# Patient Record
Sex: FEMALE | Race: WHITE | Employment: FULL TIME | URBAN - NONMETROPOLITAN AREA
[De-identification: names, ages, dates, MRNs, and addresses within clinical notes are randomized per-mention and may not be internally consistent; named-entity substitution may affect disease eponyms.]

---

## 2020-06-04 ENCOUNTER — HOSPITAL ENCOUNTER (OUTPATIENT)
Age: 30
Setting detail: SPECIMEN
Discharge: HOME | End: 2020-06-04
Payer: COMMERCIAL

## 2020-06-04 LAB — RUBV IGG SER QL: 115.5 IU/ML

## 2020-06-04 PROCEDURE — 86762 RUBELLA ANTIBODY: CPT

## 2020-09-17 ENCOUNTER — ROUTINE PRENATAL (OUTPATIENT)
Dept: OBGYN CLINIC | Age: 30
End: 2020-09-17
Payer: COMMERCIAL

## 2020-09-17 VITALS
DIASTOLIC BLOOD PRESSURE: 72 MMHG | HEIGHT: 64 IN | SYSTOLIC BLOOD PRESSURE: 102 MMHG | BODY MASS INDEX: 24.24 KG/M2 | WEIGHT: 142 LBS

## 2020-09-17 PROCEDURE — 99213 OFFICE O/P EST LOW 20 MIN: CPT | Performed by: OBSTETRICS & GYNECOLOGY

## 2020-09-17 NOTE — PROGRESS NOTES
DATE OF VISIT:  9/17/20    PATIENT NAME:  Edy Bass     YOB: 1990    REASON FOR VISIT:  No chief complaint on file. HISTORY OF PRESENT ILLNESS:  Had some bleeding at the beg of the preg - nothing since - no cramping; some food aversions; had hx of endometriosis and didn't think this would happen so fast      Patient's last menstrual period was 07/24/2020 (exact date). Vitals:    09/17/20 1616   BP: 102/72   Site: Right Upper Arm   Position: Sitting   Weight: 142 lb (64.4 kg)   Height: 5' 4\" (1.626 m)     Body mass index is 24.37 kg/m². No Known Allergies  Current Outpatient Medications   Medication Sig Dispense Refill    Prenatal Vit-Fe Fumarate-FA (PRENATAL VITAMINS PO) Take by mouth      FISH OIL-KRILL OIL PO Take by mouth      Probiotic Product (PROBIOTIC DAILY PO) Take by mouth       No current facility-administered medications for this visit.       Social History     Socioeconomic History    Marital status:      Spouse name: None    Number of children: None    Years of education: None    Highest education level: None   Occupational History    None   Social Needs    Financial resource strain: None    Food insecurity     Worry: None     Inability: None    Transportation needs     Medical: None     Non-medical: None   Tobacco Use    Smoking status: Never Smoker    Smokeless tobacco: Never Used   Substance and Sexual Activity    Alcohol use: Not Currently    Drug use: Never    Sexual activity: Yes     Partners: Male   Lifestyle    Physical activity     Days per week: None     Minutes per session: None    Stress: None   Relationships    Social connections     Talks on phone: None     Gets together: None     Attends [employer]ism service: None     Active member of club or organization: None     Attends meetings of clubs or organizations: None     Relationship status: None    Intimate partner violence     Fear of current or ex partner: None     Emotionally abused: None     Physically abused: None     Forced sexual activity: None   Other Topics Concern    None   Social History Narrative    None       REVIEW OF SYSTEMS:  Review of Systems   Gastrointestinal: Positive for nausea (food aversion). Genitourinary: Positive for menstrual problem (missed menses). Negative for dysuria, vaginal bleeding and vaginal discharge. PHYSICAL EXAM:  /72 (Site: Right Upper Arm, Position: Sitting)   Ht 5' 4\" (1.626 m)   Wt 142 lb (64.4 kg)   LMP 07/24/2020 (Exact Date)   BMI 24.37 kg/m²   Physical Exam  Constitutional:       Appearance: Normal appearance. HENT:      Head: Normocephalic and atraumatic. Eyes:      Extraocular Movements: Extraocular movements intact. Pupils: Pupils are equal, round, and reactive to light. Neck:      Musculoskeletal: Normal range of motion. Cardiovascular:      Rate and Rhythm: Normal rate. Pulmonary:      Effort: Pulmonary effort is normal.   Musculoskeletal: Normal range of motion. Neurological:      Mental Status: She is alert and oriented to person, place, and time. Skin:     General: Skin is warm and dry. Psychiatric:         Mood and Affect: Mood normal.         Behavior: Behavior normal.         Thought Content: Thought content normal.         Judgment: Judgment normal.         ASSESSMENT:    1. Amenorrhea        PLAN:  No orders of the defined types were placed in this encounter. Return in about 4 weeks (around 10/15/2020) for ob new ob/pni.        Electronically signed by Varsha Burnette DO on 09/19/20

## 2020-09-19 ASSESSMENT — ENCOUNTER SYMPTOMS: NAUSEA: 1

## 2020-10-13 ENCOUNTER — TELEPHONE (OUTPATIENT)
Dept: OBGYN CLINIC | Age: 30
End: 2020-10-13

## 2020-10-15 ENCOUNTER — INITIAL PRENATAL (OUTPATIENT)
Dept: OBGYN CLINIC | Age: 30
End: 2020-10-15

## 2020-10-15 ENCOUNTER — HOSPITAL ENCOUNTER (OUTPATIENT)
Age: 30
Setting detail: SPECIMEN
Discharge: HOME | End: 2020-10-15
Payer: COMMERCIAL

## 2020-10-15 VITALS
DIASTOLIC BLOOD PRESSURE: 62 MMHG | HEIGHT: 64 IN | BODY MASS INDEX: 25.1 KG/M2 | SYSTOLIC BLOOD PRESSURE: 108 MMHG | WEIGHT: 147 LBS

## 2020-10-15 VITALS — SYSTOLIC BLOOD PRESSURE: 108 MMHG | DIASTOLIC BLOOD PRESSURE: 62 MMHG | WEIGHT: 147 LBS | BODY MASS INDEX: 25.23 KG/M2

## 2020-10-15 LAB
ABO/RH: NORMAL
ABSOLUTE EOS #: 0.04 K/UL (ref 0–0.44)
ABSOLUTE IMMATURE GRANULOCYTE: 0.04 K/UL (ref 0–0.3)
ABSOLUTE LYMPH #: 1.77 K/UL (ref 1.1–3.7)
ABSOLUTE MONO #: 0.56 K/UL (ref 0.1–1.2)
ANTIBODY SCREEN: NEGATIVE
BASOPHILS # BLD: 1 % (ref 0–2)
BASOPHILS ABSOLUTE: 0.06 K/UL (ref 0–0.2)
BILIRUBIN URINE: NEGATIVE
COLOR: YELLOW
COMMENT UA: ABNORMAL
DIFFERENTIAL TYPE: ABNORMAL
EOSINOPHILS RELATIVE PERCENT: 1 % (ref 1–4)
GLUCOSE URINE: ABNORMAL
HCT VFR BLD CALC: 36.3 % (ref 36.3–47.1)
HEMOGLOBIN: 12 G/DL (ref 11.9–15.1)
HEPATITIS B SURFACE ANTIGEN: NONREACTIVE
HEPATITIS C ANTIBODY: NONREACTIVE
HIV AG/AB: NONREACTIVE
IMMATURE GRANULOCYTES: 1 %
KETONES, URINE: NEGATIVE
LEUKOCYTE ESTERASE, URINE: NEGATIVE
LYMPHOCYTES # BLD: 20 % (ref 24–43)
MCH RBC QN AUTO: 30.9 PG (ref 25.2–33.5)
MCHC RBC AUTO-ENTMCNC: 33.1 G/DL (ref 28.4–34.8)
MCV RBC AUTO: 93.6 FL (ref 82.6–102.9)
MONOCYTES # BLD: 6 % (ref 3–12)
NITRITE, URINE: NEGATIVE
NRBC AUTOMATED: 0 PER 100 WBC
PDW BLD-RTO: 12.1 % (ref 11.8–14.4)
PH UA: 6.5 (ref 5–8)
PLATELET # BLD: 278 K/UL (ref 138–453)
PLATELET ESTIMATE: ABNORMAL
PMV BLD AUTO: 9.6 FL (ref 8.1–13.5)
PROTEIN UA: NEGATIVE
RBC # BLD: 3.88 M/UL (ref 3.95–5.11)
RBC # BLD: ABNORMAL 10*6/UL
RUBV IGG SER QL: 86.3 IU/ML
SEG NEUTROPHILS: 71 % (ref 36–65)
SEGMENTED NEUTROPHILS ABSOLUTE COUNT: 6.37 K/UL (ref 1.5–8.1)
SPECIFIC GRAVITY UA: 1 (ref 1–1.03)
T. PALLIDUM, IGG: NONREACTIVE
TURBIDITY: CLEAR
URINE HGB: NEGATIVE
UROBILINOGEN, URINE: NORMAL
WBC # BLD: 8.8 K/UL (ref 3.5–11.3)
WBC # BLD: ABNORMAL 10*3/UL

## 2020-10-15 PROCEDURE — 0500F INITIAL PRENATAL CARE VISIT: CPT | Performed by: NURSE PRACTITIONER

## 2020-10-15 NOTE — PROGRESS NOTES
Here for PNI, has had some nausea but states it is improving. Denies hx of MRSA. Unsure about genetic testing but does desire MSAFP at appropriate time. Has NOB appt following PNI.  will be drawn today. Questions answered and forms signed.

## 2020-10-15 NOTE — PATIENT INSTRUCTIONS
Patient Education        Weeks 10 to 14 of Your Pregnancy: Care Instructions  Your Care Instructions     By weeks 10 to 14 of your pregnancy, the placenta has formed inside your uterus. It is possible to hear your baby's heartbeat with a special ultrasound device. Your baby's eyes can and do move. The arms and legs can bend. This is a good time to think about testing for birth defects. There are two types of tests: screening and diagnostic. Screening tests show the chance that a baby has a certain birth defect. They can't tell you for sure that your baby has a problem. Diagnostic tests show if a baby has a certain birth defect. It's your choice whether to have these tests. You and your partner can talk to your doctor or midwife about birth defects tests. Follow-up care is a key part of your treatment and safety. Be sure to make and go to all appointments, and call your doctor if you are having problems. It's also a good idea to know your test results and keep a list of the medicines you take. How can you care for yourself at home? Decide about tests  · You can have screening tests and diagnostic tests to check for birth defects. The decision to have a test for birth defects is personal. Think about your age, your chance of passing on a family disease, your need to know about any problems, and what you might do after you have the test results. ? Triple or quadruple (quad) blood tests. These screening tests can be done between 15 and 20 weeks of pregnancy. They check the amounts of three or four substances in your blood. The doctor looks at these test results, along with your age and other factors, to find out the chance that your baby may have certain problems. ? Amniocentesis. This diagnostic test is used to look for chromosomal problems in the baby's cells.  It can be done between 15 and 20 weeks of pregnancy, usually around week 16.  ? Nuchal translucency test. This test uses ultrasound to measure the thickness of the area at the back of the baby's neck. An increase in the thickness can be an early sign of Down syndrome. ? Chorionic villus sampling (CVS). This is a test that looks for certain genetic problems with your baby. The same genes that are in your baby are in the placenta. A small piece of the placenta is taken out and tested. This test is done when you are 10 to 13 weeks pregnant. Ease discomfort  · Slow down and take naps when you feel tired. · If your emotions swing, talk to someone. Crying, anxiety, and concentration problems are common. · If your gums bleed, try a softer toothbrush. If your gums are puffy and bleed a lot, see your dentist.  · If you feel dizzy:  ? Get up slowly after sitting or lying down. ? Drink plenty of fluids. ? Eat small snacks to keep your blood sugar stable. ? Put your head between your legs as though you were tying your shoelaces. ? Lie down with your legs higher than your head. Use pillows to prop up your feet. · If you have a headache:  ? Lie down. ? Ask your partner or a good friend for a neck massage. ? Try cool cloths over your forehead or across the back of your neck. ? Use acetaminophen (Tylenol) for pain relief. Do not use nonsteroidal anti-inflammatory drugs (NSAIDs), such as ibuprofen (Advil, Motrin) or naproxen (Aleve), unless your doctor says it is okay. · If you have a nosebleed, pinch your nose gently, and hold it for a short while. To prevent nosebleeds, try massaging a small dab of petroleum jelly, such as Vaseline, in your nostrils. · If your nose is stuffed up, try saline (saltwater) nose sprays. Do not use decongestant sprays. Care for your breasts  · Wear a bra that gives you good support. · Know that changes in your breasts are normal.  ? Your breasts may get larger and more tender. Tenderness usually gets better by 12 weeks. ? Your nipples may get darker and larger, and small bumps around your nipples may show more. ?  The veins in your chest and breasts may show more. · Don't worry about \"toughening'\" your nipples. Breastfeeding will naturally do this. Where can you learn more? Go to https://[place] GenomicspematthewMaytech.[employer] Software. org and sign in to your [employer] account. Enter S059 in the [employer] box to learn more about \"Weeks 10 to 14 of Your Pregnancy: Care Instructions. \"     If you do not have an account, please click on the \"Sign Up Now\" link. Current as of: February 11, 2020               Content Version: 12.6  © 3383-1669 [employer]ett[employer]. Care instructions adapted under license by [email or web address] [facility] ([facility]). If you have questions about a medical condition or this instruction, always ask your healthcare professional. [address] any warranty or liability for your use of this information. Patient Education        Learning About Starting to Breastfeed  Planning ahead     Before your baby is born, plan ahead. Learn all you can about breastfeeding. This helps make breastfeeding easier. · Early in your pregnancy, talk to your doctor or midwife about breastfeeding. · Learn the basics before your baby is born. The staff at hospitals and birthing centers can help you find a lactation specialist. This person is often a nurse who has been trained to teach and advise women about breastfeeding. Or you can take a breastfeeding class. · Plan ahead for times when you will need help after your baby is born. Many women get help from friends and family. Some join a support group to talk to other moms who breastfeed. · Buy the equipment you'll need. Examples are breast pads, nipple cream, extra pillows, and nursing bras. Find out about breast pumps too. Getting help from your hospital or birthing center  It's important to have support from the doctors, nurses, and hospital staff who care for you and your baby.  Before it's time for you to give birth, ask about the breastfeeding policies at your hospital or birthing center. Look for a hospital or birthing center that has policies for:  · \"Rooming in. \" This policy encourages you to have your baby in the room with you. It can allow you to breastfeed more often. · Supplemental feedings. Tell the staff that your baby is to get only your breast milk from birth. If staff feed your baby water, sugar solution, or formula right after birth without a medical reason, it may make it harder for you to breastfeed. · Pacifiers or artificial nipples. Staff should not give your  these items. They may interfere with breastfeeding. · Follow-up. Find out if your hospital can help you with breastfeeding issues after you go home. See if you can get information on support groups or other contacts. They might help if you need help setting up and staying with your breastfeeding routine. Your first feeding  It's best to start breastfeeding within 1 hour of birth. For each feeding, you go through these basic steps:  · Get ready for the feeding. Be calm and relaxed, and try not to be distracted. Get some water or juice for yourself. Use two or three pillows to help support your baby while he or she is nursing. · Find a breastfeeding position that is comfortable for you and your baby. Examples are the cradle and the football positions. Make sure the baby's head and chest are lined up straight and facing your breast. It's best to switch which breast you start with each time. · Get the baby latched on well. Your baby's mouth needs to be wide open, like a yawn, so you may need to gently touch the middle of your baby's lower lip. When your baby's mouth is open wide, quickly bring the baby onto your nipple and areola. The areola is the dark [place] around your nipple. · Provide a complete feeding. Let your baby decide how long to nurse. Be sure to burp your baby after each breast.  In the first days after birth, your breasts make a thick, yellow liquid called colostrum.  This liquid gives your baby nutrients and antibodies against infection. It is all that babies need at first. Your breasts will fill with milk a few days after the birth. Talk to your doctor, midwife, or lactation specialist right away if you are having problems and aren't sure what to do. How often to breastfeed  Plan to breastfeed your baby on demand rather than setting a strict schedule. For the first 2 weeks, be prepared to breastfeed at least 8 times in a 24-hour period. In the first few days, you may need to wake a sleeping baby to feed. If you breastfeed more often, it will help your breasts to produce more milk. After you go home  After you're home, don't be afraid to call your doctor, midwife, or lactation specialist with questions. That's true even if you don't know what's bothering you. They are used to parents of newborns calling. They can help you figure out if there is a problem, and if so, how to fix it. Plan for times when you will be apart from your baby. Use a breast pump to collect breast milk ahead of time. You can store milk in the refrigerator or freezer. Then it's ready when someone else will be taking care of your baby. Experts recommend waiting about a month until breastfeeding is going well before offering a bottle. Breastfeeding is a learned skill that gets easier over time. You are more likely to succeed if you plan ahead, learn the basic techniques, and know where to get help and support. Where can you learn more? Go to https://ScreachTVvickieewNationwide Vacation Club.Prognosis Health Information Systems. org and sign in to your [employer] account. Enter X216 in the ConnectAndSell box to learn more about \"Learning About Starting to Breastfeed. \"     If you do not have an account, please click on the \"Sign Up Now\" link. Current as of: February 11, 2020               Content Version: 12.6  © 0288-5239 [employer], Incorporated. Care instructions adapted under license by [facility] ([facility]).  If you have questions about a medical condition or this instruction, always ask your healthcare professional. [place] [address] any warranty or liability for your use of this information. Patient Education        Nutrition During Pregnancy: Care Instructions  Your Care Instructions     Healthy eating when you are pregnant is important for you and your baby. It can help you feel well and have a successful pregnancy and delivery. During pregnancy your nutrition needs increase. Even if you have excellent eating habits, your doctor may recommend a multivitamin to make sure you get enough iron and folic acid. Many pregnant women wonder how much weight they should gain. In general, women who were at a healthy weight before they became pregnant should gain between 25 and 35 pounds. Women who were overweight before pregnancy are usually advised to gain 15 to 25 pounds. Women who were underweight before pregnancy are usually advised to gain 28 to 40 pounds. Your doctor will work with you to set a weight goal that is right for you. Gaining a healthy amount of weight helps you have a healthy baby. Follow-up care is a key part of your treatment and safety. Be sure to make and go to all appointments, and call your doctor if you are having problems. It's also a good idea to know your test results and keep a list of the medicines you take. How can you care for yourself at home? · Eat plenty of fruits and vegetables. Include a variety of orange, yellow, and leafy dark-green vegetables every day. · Choose whole-grain bread, cereal, and pasta. Good choices include whole wheat bread, whole wheat pasta, brown rice, and oatmeal.  · Get 4 or more servings of milk and milk products each day. Good choices include nonfat or low-fat milk, yogurt, and cheese. If you cannot eat milk products, you can get calcium from calcium-fortified products such as orange juice, soy milk, and tofu.  Other non-milk sources of calcium include leafy green vegetables, such as broccoli, kale, mustard greens, turnip greens, bok payal, and brussels sprouts. · If you eat meat, pick lower-fat types. Good choices include lean cuts of meat and chicken or turkey without the skin. · Do not eat shark, swordfish, antoine mackerel, or tilefish. They have high levels of mercury, which is dangerous to your baby. You can eat up to 12 ounces a week of fish or shellfish that have low mercury levels. Good choices include shrimp, wild salmon, pollock, and catfish. Do not eat more than 6 ounces of tuna each week. · Heat lunch meats (such as turkey, ham, or bologna) to 165°F before you eat them. This reduces your risk of getting sick from a kind of bacteria that can be found in lunch meats. · Do not eat unpasteurized soft cheeses, such as brie, feta, fresh mozzarella, and blue cheese. They have a bacteria that could harm your baby. · Limit caffeine. If you drink coffee or tea, have no more than 1 cup a day. Caffeine is also found in greg. · Do not drink any alcohol. No amount of alcohol has been found to be safe during pregnancy. · Do not diet or try to lose weight. For example, do not follow a low-carbohydrate diet. If you are overweight at the start of your pregnancy, your doctor will work with you to manage your weight gain. · Tell your doctor about all vitamins and supplements you take. When should you call for help? Watch closely for changes in your health, and be sure to contact your doctor if you have any problems. Where can you learn more? Go to https://NodePrimedee dee.healthZarthCode. org and sign in to your [employer] account. Enter Y785 in the [employer] box to learn more about \"Nutrition During Pregnancy: Care Instructions. \"     If you do not have an account, please click on the \"Sign Up Now\" link. Current as of: February 11, 2020               Content Version: 12.6  © 8789-8621 Follicum, Incorporated. Care instructions adapted under license by [facility] ([facility]).  If you have questions about a medical

## 2020-10-15 NOTE — PROGRESS NOTES
routine OB. There are no Patient Instructions on file for this visit.           Cheng Cooley,10/15/2020 2:00 PM

## 2020-10-22 LAB
HPV SOURCE: NORMAL
HPV, GENOTYPE 16: NOT DETECTED
HPV, GENOTYPE 18: NOT DETECTED
HPV, HIGH RISK OTHER: NOT DETECTED

## 2020-10-23 LAB
CHLAMYDIA BY THIN PREP: NEGATIVE
CYTOLOGY REPORT: NORMAL
N. GONORRHOEAE DNA, THIN PREP: NEGATIVE
SPECIMEN DESCRIPTION: NORMAL

## 2020-11-10 ENCOUNTER — TELEPHONE (OUTPATIENT)
Dept: OBGYN CLINIC | Age: 30
End: 2020-11-10

## 2020-11-10 NOTE — TELEPHONE ENCOUNTER
Pt called stating she has an appt with Dr Mami Pedraza on Thurs and would like to have genetic testing done at that time. Prequel and Foresight forms filled out and in lab.

## 2020-11-12 ENCOUNTER — ROUTINE PRENATAL (OUTPATIENT)
Dept: OBGYN CLINIC | Age: 30
End: 2020-11-12

## 2020-11-12 ENCOUNTER — HOSPITAL ENCOUNTER (OUTPATIENT)
Age: 30
Setting detail: SPECIMEN
Discharge: HOME | End: 2020-11-12
Payer: COMMERCIAL

## 2020-11-12 VITALS — DIASTOLIC BLOOD PRESSURE: 68 MMHG | BODY MASS INDEX: 25.75 KG/M2 | SYSTOLIC BLOOD PRESSURE: 112 MMHG | WEIGHT: 150 LBS

## 2020-11-12 PROCEDURE — 0502F SUBSEQUENT PRENATAL CARE: CPT | Performed by: OBSTETRICS & GYNECOLOGY

## 2020-11-12 NOTE — PROGRESS NOTES
Sharri Vazquez is a 27 y.o. female 14w8d      The patient was seen and evaluated. Fetal movement was Absent . No contractions or leakage of fluid. Signs and symptoms of  labor as well as labor were reviewed. Genetic testing was ordered and reviewed. MSAFP was ordered for a 15-20 week gestational age window.  Reviewed. Dates were reviewed with the patient. An 18-22 week anatomy ultrasound has been ordered. The patient will return to the office for her next visit in 4 weeks. See antepartum flow sheet. Denies hx of MRSA  Desires Prequel, Foresight and MSAFP        Assessment:  1. Sharri Vazquez is a 27 y.o. female  2.   3. 15w6d    There are no active problems to display for this patient. Diagnosis Orders   1. 15 weeks gestation of pregnancy  Alpha Fetoprotein, Maternal    Prenatal Testing for Fetal Aneuploidy    Cystic fibrosis carrier study         Plan:  Return in about 4 weeks (around 12/10/2020) for ob, usn. There are no Patient Instructions on file for this visit.

## 2020-11-15 LAB
AFP INTERPRETATION: NORMAL
AFP MOM: 0.78
AFP SPECIMEN: NORMAL
AFP: 25 NG/ML
DATE OF BIRTH: NORMAL
DATING METHOD: NORMAL
DETERMINED BY: NORMAL
DIABETIC: NORMAL
DUE DATE: NORMAL
ESTIMATED DUE DATE: NORMAL
FAMILY HISTORY NTD: NORMAL
GESTATIONAL AGE: NORMAL
INSULIN REQ DIABETES: NO
LAST MENSTRUAL PERIOD: NORMAL
MATERNAL AGE AT EDD: 31.3 YR
MATERNAL WEIGHT: 150
MONOCHORIONIC TWINS: NORMAL
NUMBER OF FETUSES: NORMAL
PATIENT WEIGHT UNITS: NORMAL
PATIENT WEIGHT: NORMAL
RACE (MATERNAL): NORMAL
RACE: NORMAL
REPEAT SPECIMEN?: NO
SMOKING: NO
SMOKING: NORMAL
VALPROIC/CARBAMAZEP: NORMAL
ZZ NTE CLEAN UP: HISTORY: NO

## 2020-12-08 ENCOUNTER — TELEPHONE (OUTPATIENT)
Dept: OBGYN CLINIC | Age: 30
End: 2020-12-08

## 2020-12-10 ENCOUNTER — ROUTINE PRENATAL (OUTPATIENT)
Dept: OBGYN CLINIC | Age: 30
End: 2020-12-10

## 2020-12-10 VITALS — WEIGHT: 152 LBS | BODY MASS INDEX: 26.09 KG/M2 | SYSTOLIC BLOOD PRESSURE: 112 MMHG | DIASTOLIC BLOOD PRESSURE: 60 MMHG

## 2020-12-10 PROCEDURE — 0502F SUBSEQUENT PRENATAL CARE: CPT | Performed by: OBSTETRICS & GYNECOLOGY

## 2020-12-10 NOTE — PROGRESS NOTES
Tuan Navarro is a 27 y.o. female 23w7d    The patient was seen and evaluated. There was positive fetal movements. No contractions or leakage of fluid. Signs and symptoms of  labor as well as labor were reviewed. Genetic testing was reviewed and found to be normal. The patients anatomy ultrasound has been completed and reviewed with patient. A 28 week lab panel was ordered. This includes a (HH, 1 hr GTT, 3 hr GTT). The patient is to complete this in the next two to four weeks. The S/S of Pre-Eclampsia were reviewed with the patient in detail. She is to report any of these if they occur. She currently denies any of these. The patient is RH positive Rhogam Ordered no    The patient was instructed on fetal kick counts and was encouraged to monitor every 8 hours. This is to begin at 28 weeks gestation. She was instructed that the baby should move at a minimum of ten times within one hour after a meal. The patient was instructed to lay down on her left side twenty minutes after eating and count movements for up to one hour with a target value of ten movements. She was instructed to notify the office if she did not make that target after two attempts or if after any attempt there was less than four movements. Denies hx of MRSA  Desires Prequel, Foresight and MSAFP      Assessment:  1. Tuan Navarro is a 27 y.o. female  2.   3. 19w6d    There are no active problems to display for this patient. Diagnosis Orders   1. 19 weeks gestation of pregnancy           Plan:  The patient will return to the office for her next visit in 4 weeks. See antepartum flow sheet.

## 2021-01-07 ENCOUNTER — ROUTINE PRENATAL (OUTPATIENT)
Dept: OBGYN CLINIC | Age: 31
End: 2021-01-07

## 2021-01-07 VITALS — BODY MASS INDEX: 26.95 KG/M2 | WEIGHT: 157 LBS | SYSTOLIC BLOOD PRESSURE: 126 MMHG | DIASTOLIC BLOOD PRESSURE: 64 MMHG

## 2021-01-07 DIAGNOSIS — Z3A.23 23 WEEKS GESTATION OF PREGNANCY: ICD-10-CM

## 2021-01-07 DIAGNOSIS — Z34.92 NORMAL PREGNANCY, SECOND TRIMESTER: Primary | ICD-10-CM

## 2021-01-07 PROCEDURE — 0502F SUBSEQUENT PRENATAL CARE: CPT | Performed by: OBSTETRICS & GYNECOLOGY

## 2021-01-07 RX ORDER — AZELAIC ACID 0.15 G/G
GEL TOPICAL
COMMUNITY
Start: 2020-06-10

## 2021-01-07 NOTE — PROGRESS NOTES
Karis Bazan is a 27 y.o. female 24w9d    The patient was seen and evaluated. There was positive fetal movements. No contractions or leakage of fluid. Signs and symptoms of  labor as well as labor were reviewed. Genetic testing was reviewed and found to be normal. The patients anatomy ultrasound has been completed and reviewed with patient. A 28 week lab panel was ordered. This includes a (HH, 1 hr GTT, 3 hr GTT). The patient is to complete this in the next two to four weeks. The S/S of Pre-Eclampsia were reviewed with the patient in detail. She is to report any of these if they occur. She currently denies any of these. The patient is RH positive Rhogam Ordered no    The patient was instructed on fetal kick counts and was encouraged to monitor every 8 hours. This is to begin at 28 weeks gestation. She was instructed that the baby should move at a minimum of ten times within one hour after a meal. The patient was instructed to lay down on her left side twenty minutes after eating and count movements for up to one hour with a target value of ten movements. She was instructed to notify the office if she did not make that target after two attempts or if after any attempt there was less than four movements. Denies hx of MRSA  Desires Prequel, Foresight and MSAFP      Assessment:  1. Karis Bazan is a 27 y.o. female  2.   3. 23w6d    There are no active problems to display for this patient. Diagnosis Orders   1. Normal pregnancy, second trimester  CBC With Auto Differential    Glucose Tolerance, 1 Hr   2. 23 weeks gestation of pregnancy           Plan:  The patient will return to the office for her next visit in 4 weeks. See antepartum flow sheet.

## 2021-02-04 ENCOUNTER — HOSPITAL ENCOUNTER (OUTPATIENT)
Age: 31
Setting detail: SPECIMEN
Discharge: HOME | End: 2021-02-04
Payer: COMMERCIAL

## 2021-02-04 ENCOUNTER — ROUTINE PRENATAL (OUTPATIENT)
Dept: OBGYN CLINIC | Age: 31
End: 2021-02-04

## 2021-02-04 VITALS — SYSTOLIC BLOOD PRESSURE: 128 MMHG | BODY MASS INDEX: 27.81 KG/M2 | DIASTOLIC BLOOD PRESSURE: 68 MMHG | WEIGHT: 162 LBS

## 2021-02-04 DIAGNOSIS — Z3A.27 27 WEEKS GESTATION OF PREGNANCY: Primary | ICD-10-CM

## 2021-02-04 DIAGNOSIS — Z34.92 NORMAL PREGNANCY, SECOND TRIMESTER: ICD-10-CM

## 2021-02-04 LAB
ABSOLUTE EOS #: 0.06 K/UL (ref 0–0.44)
ABSOLUTE IMMATURE GRANULOCYTE: 0.11 K/UL (ref 0–0.3)
ABSOLUTE LYMPH #: 1.65 K/UL (ref 1.1–3.7)
ABSOLUTE MONO #: 0.66 K/UL (ref 0.1–1.2)
BASOPHILS # BLD: 1 % (ref 0–2)
BASOPHILS ABSOLUTE: 0.05 K/UL (ref 0–0.2)
DIFFERENTIAL TYPE: ABNORMAL
EOSINOPHILS RELATIVE PERCENT: 1 % (ref 1–4)
HCT VFR BLD CALC: 33.5 % (ref 36.3–47.1)
HEMOGLOBIN: 10.8 G/DL (ref 11.9–15.1)
IMMATURE GRANULOCYTES: 1 %
LYMPHOCYTES # BLD: 19 % (ref 24–43)
MCH RBC QN AUTO: 31.1 PG (ref 25.2–33.5)
MCHC RBC AUTO-ENTMCNC: 32.2 G/DL (ref 28.4–34.8)
MCV RBC AUTO: 96.5 FL (ref 82.6–102.9)
MONOCYTES # BLD: 8 % (ref 3–12)
NRBC AUTOMATED: 0 PER 100 WBC
PDW BLD-RTO: 13.4 % (ref 11.8–14.4)
PLATELET # BLD: 253 K/UL (ref 138–453)
PLATELET ESTIMATE: ABNORMAL
PMV BLD AUTO: 10 FL (ref 8.1–13.5)
RBC # BLD: 3.47 M/UL (ref 3.95–5.11)
RBC # BLD: ABNORMAL 10*6/UL
SEG NEUTROPHILS: 70 % (ref 36–65)
SEGMENTED NEUTROPHILS ABSOLUTE COUNT: 6.31 K/UL (ref 1.5–8.1)
WBC # BLD: 8.8 K/UL (ref 3.5–11.3)
WBC # BLD: ABNORMAL 10*3/UL

## 2021-02-04 PROCEDURE — 0502F SUBSEQUENT PRENATAL CARE: CPT | Performed by: OBSTETRICS & GYNECOLOGY

## 2021-02-04 NOTE — PROGRESS NOTES
determined by [employer]. See Compliance Statement B: [employer]      Maternal Age At [age]OEBE 2020 [age]  yr Final    Patient Weight 2020 150.0 lbs. Final    Est Due Date 2020 [id number]   Final    Gestational Age 2020 15 wks, 6 days   Final    Dating Method 2020 LMP   Final    Number of Fetuses 2020 Lynn   Final    Race 2020 Nonblack   Final    Insulin Req Diabetes 2020 No   Final    Smoking 2020 No   Final    History 2020 No   Final    AFP Specimen 2020 See Note   Final    Comment: (NOTE)  Initial sample  [facility] [address] [facility] [phone]  www. Wilfred Fuentes MD, Lab. Director         Denies hx of MRSA  Desires Prequel (-) and MSAFP (-)      T-Dap Vaccine (27-36 weeks): awaiting    The patient was instructed on fetal kick counts and is encouraged to monitor every 8 hours. She was instructed that the baby should move at a minimum of ten times within one hour after a meal. The patient was instructed to lay down on her left side twenty minutes after eating and count movements for up to one hour with a target value of ten movements. She was instructed to notify the office if she did not make that target after two attempts or if after any attempt there was less than four movements. The patient reports that the fetal movement targets have been made Yes. Assessment:  1Clark Hong is a 32 y.o. female  2.   3. 27w6d    There are no active problems to display for this patient. Diagnosis Orders   1. 27 weeks gestation of pregnancy           Plan:  The patient will return to the office for her next visit in 2 weeks. See antepartum flow sheet. Visits will continue every 2 weeks in the third trimester. At 36 weeks will have GBS swab performed and begin weekly visits.      Testing Indicated: No  Scheduled with [facility] Office Staff - Pt notified: No

## 2021-02-05 LAB
GLUCOSE ADMINISTRATION: NORMAL
GLUCOSE TOLERANCE SCREEN 50G: 119 MG/DL (ref 70–135)

## 2021-02-17 ENCOUNTER — TELEPHONE (OUTPATIENT)
Dept: OBGYN CLINIC | Age: 31
End: 2021-02-17

## 2021-02-18 ENCOUNTER — ROUTINE PRENATAL (OUTPATIENT)
Dept: OBGYN CLINIC | Age: 31
End: 2021-02-18

## 2021-02-18 VITALS — BODY MASS INDEX: 28.15 KG/M2 | SYSTOLIC BLOOD PRESSURE: 122 MMHG | WEIGHT: 164 LBS | DIASTOLIC BLOOD PRESSURE: 60 MMHG

## 2021-02-18 DIAGNOSIS — Z3A.30 30 WEEKS GESTATION OF PREGNANCY: Primary | ICD-10-CM

## 2021-02-18 PROCEDURE — 0502F SUBSEQUENT PRENATAL CARE: CPT | Performed by: OBSTETRICS & GYNECOLOGY

## 2021-02-18 RX ORDER — BREAST PUMP
1 EACH MISCELLANEOUS
Qty: 1 EACH | Refills: 0 | Status: ON HOLD | OUTPATIENT
Start: 2021-02-18 | End: 2021-05-05 | Stop reason: HOSPADM

## 2021-02-18 RX ORDER — BREAST PUMP
EACH MISCELLANEOUS
Qty: 1 EACH | Refills: 0
Start: 2021-02-18 | End: 2021-02-18

## 2021-02-18 RX ORDER — BREAST PUMP
1 EACH MISCELLANEOUS
Qty: 1 EACH | Refills: 0 | Status: SHIPPED | OUTPATIENT
Start: 2021-02-18 | End: 2021-02-18

## 2021-02-18 NOTE — PROGRESS NOTES
Latesha Collins is a 32 y.o. female 33w8d    The patient was seen and evaluated. There was positive fetal movements. No contractions or leakage of fluid. Signs and symptoms of  labor as well as labor were reviewed. The S/S of Pre-Eclampsia were reviewed with the patient in detail. She is to report any of these if they occur. She currently denies any of these. The patient had her 28 week labs completed.   Hospital Outpatient Visit on 2021   Component Date Value Ref Range Status    GLU ADMN 2021 Glucola   Final    Glucose tolerance screen 50g 2021 119  70 - 135 mg/dL Final    WBC 2021 8.8  3.5 - 11.3 k/uL Final    RBC 2021 3.47* 3.95 - 5.11 m/uL Final    Hemoglobin 2021 10.8* 11.9 - 15.1 g/dL Final    Hematocrit 2021 33.5* 36.3 - 47.1 % Final    MCV 2021 96.5  82.6 - 102.9 fL Final    MCH 2021 31.1  25.2 - 33.5 pg Final    MCHC 2021 32.2  28.4 - 34.8 g/dL Final    RDW 2021 13.4  11.8 - 14.4 % Final    Platelets  253  138 - 453 k/uL Final    MPV 2021 10.0  8.1 - 13.5 fL Final    NRBC Automated 2021 0.0  0.0 per 100 WBC Final    Differential Type 2021 NOT REPORTED   Final    Seg Neutrophils 2021 70* 36 - 65 % Final    Lymphocytes 2021 19* 24 - 43 % Final    Monocytes 2021 8  3 - 12 % Final    Eosinophils % 2021 1  1 - 4 % Final    Basophils 2021 1  0 - 2 % Final    Immature Granulocytes 2021 1* 0 % Final    Segs Absolute 2021 6.31  1.50 - 8.10 k/uL Final    Absolute Lymph # 2021 1.65  1.10 - 3.70 k/uL Final    Absolute Mono # 2021 0.66  0.10 - 1.20 k/uL Final    Absolute Eos # 2021 0.06  0.00 - 0.44 k/uL Final    Basophils Absolute 2021 0.05  0.00 - 0.20 k/uL Final    Absolute Immature Granulocyte 2021 0.11  0.00 - 0.30 k/uL Final    WBC Morphology 2021 NOT REPORTED   Final    RBC Morphology 2021 NOT REPORTED   Final    Platelet Estimate  NOT REPORTED   Final       Denies hx of MRSA  Desires Prequel (-) and MSAFP (-)  TDAP through [facility]      T-Dap Vaccine (27-36 weeks): completed    The patient was instructed on fetal kick counts and is encouraged to monitor every 8 hours. She was instructed that the baby should move at a minimum of ten times within one hour after a meal. The patient was instructed to lay down on her left side twenty minutes after eating and count movements for up to one hour with a target value of ten movements. She was instructed to notify the office if she did not make that target after two attempts or if after any attempt there was less than four movements. The patient reports that the fetal movement targets have been made Yes.  Testing:  na    Assessment:  1. Melisa Postin is a 32 y.o. female  2.   3. 29w6d    There are no active problems to display for this patient. Diagnosis Orders   1. 30 weeks gestation of pregnancy           Plan:  The patient will return to the office for her next visit in 2 weeks. See antepartum flow sheet. Visits will continue every 2 weeks in the third trimester. At 36 weeks will have GBS swab performed and begin weekly visits.      Testing Indicated: No  Scheduled with [facility] Office Staff - Pt notified: No

## 2021-03-02 ENCOUNTER — ROUTINE PRENATAL (OUTPATIENT)
Dept: OBGYN | Age: 31
End: 2021-03-02
Payer: COMMERCIAL

## 2021-03-02 VITALS — DIASTOLIC BLOOD PRESSURE: 64 MMHG | BODY MASS INDEX: 29.01 KG/M2 | WEIGHT: 169 LBS | SYSTOLIC BLOOD PRESSURE: 108 MMHG

## 2021-03-02 DIAGNOSIS — Z23 NEED FOR TDAP VACCINATION: Primary | ICD-10-CM

## 2021-03-02 DIAGNOSIS — Z34.03 ENCOUNTER FOR SUPERVISION OF NORMAL FIRST PREGNANCY IN THIRD TRIMESTER: ICD-10-CM

## 2021-03-02 DIAGNOSIS — Z3A.31 31 WEEKS GESTATION OF PREGNANCY: ICD-10-CM

## 2021-03-02 PROCEDURE — 0502F SUBSEQUENT PRENATAL CARE: CPT | Performed by: ADVANCED PRACTICE MIDWIFE

## 2021-03-02 PROCEDURE — 90715 TDAP VACCINE 7 YRS/> IM: CPT | Performed by: ADVANCED PRACTICE MIDWIFE

## 2021-03-02 PROCEDURE — 90471 IMMUNIZATION ADMIN: CPT | Performed by: ADVANCED PRACTICE MIDWIFE

## 2021-03-02 NOTE — PROGRESS NOTES
Melisa Vega is here at 31w4d for:    Chief Complaint   Patient presents with    Routine Prenatal Visit     Patient here for routine ob visit; normally does appts in [facility] [place] but works downstairs at [place]; patient feeling well, has good fetal movement. Patient desires tdap today also       Estimated Due Date: Estimated Date of Delivery: 21    OB History    Para Term  AB Living   1             SAB TAB Ectopic Molar Multiple Live Births                    # Outcome Date GA Lbr Daryl/2nd Weight Sex Delivery Anes PTL Lv   1 Current                 Past Medical History:   Diagnosis Date    Endometriosis        Past Surgical History:   Procedure Laterality Date    OVARIAN CYST SURGERY Left 2015    TONSILLECTOMY      WISDOM TOOTH EXTRACTION         Social History     Tobacco Use   Smoking Status Never Smoker   Smokeless Tobacco Never Used        Social History     Substance and Sexual Activity   Alcohol Use Not Currently       No results found for this visit on 21. Vitals:  /64   Wt 169 lb (76.7 kg)   LMP 2020 (Exact Date)   BMI 29.01 kg/m²   Estimated body mass index is 29.01 kg/m² as calculated from the following:    Height as of 10/15/20: 5' 4\" (1.626 m). Weight as of this encounter: 169 lb (76.7 kg). HPI: here for routine ob visit, denies c/o     PT denies fever, chills, nausea and vomiting       Abdomen: enlarged, gravid, sof,t nontender         Results reviewed today:    No results found for this visit on 21. See prenatal vital sign section and fetal assessment section    ASSESSMENT & Plan    Diagnosis Orders   1. Need for Tdap vaccination  Tdap (age 6y and older) IM ([address])   2.  Encounter for supervision of normal first pregnancy in third trimester     3. 31 weeks gestation of pregnancy               I am having Melisa Gary maintain her Prenatal Vit-Fe Fumarate-FA (PRENATAL VITAMINS PO), FISH OIL-KRILL OIL PO, Probiotic Product (PROBIOTIC DAILY PO), Azelaic Acid, and Breast Pump. Return in about 2 weeks (around 3/16/2021) for ob. There are no Patient Instructions on file for this visit.           Kimberly Bowden,3/2/2021 4:45 PM

## 2021-03-16 ENCOUNTER — ROUTINE PRENATAL (OUTPATIENT)
Dept: OBGYN | Age: 31
End: 2021-03-16
Payer: COMMERCIAL

## 2021-03-16 VITALS — WEIGHT: 169 LBS | DIASTOLIC BLOOD PRESSURE: 68 MMHG | SYSTOLIC BLOOD PRESSURE: 102 MMHG | BODY MASS INDEX: 29.01 KG/M2

## 2021-03-16 DIAGNOSIS — Z3A.33 33 WEEKS GESTATION OF PREGNANCY: ICD-10-CM

## 2021-03-16 PROCEDURE — 0502F SUBSEQUENT PRENATAL CARE: CPT | Performed by: OBSTETRICS & GYNECOLOGY

## 2021-03-16 NOTE — PROGRESS NOTES
Delphine Boeck is here at 33w4d for:    Chief Complaint   Patient presents with    Routine Prenatal Visit     33.4 weeks-doing well       Estimated Due Date: Estimated Date of Delivery: 21    OB History    Para Term  AB Living   1             SAB TAB Ectopic Molar Multiple Live Births                    # Outcome Date GA Lbr Daryl/2nd Weight Sex Delivery Anes PTL Lv   1 Current                 Past Medical History:   Diagnosis Date    Endometriosis        Past Surgical History:   Procedure Laterality Date    OVARIAN CYST SURGERY Left 2015    TONSILLECTOMY      WISDOM TOOTH EXTRACTION         Social History     Tobacco Use   Smoking Status Never Smoker   Smokeless Tobacco Never Used        Social History     Substance and Sexual Activity   Alcohol Use Not Currently       No results found for this visit on 21. Vitals:  /68   Wt 169 lb (76.7 kg)   LMP 2020 (Exact Date)   BMI 29.01 kg/m²   Estimated body mass index is 29.01 kg/m² as calculated from the following:    Height as of 10/15/20: 5' 4\" (1.626 m). Weight as of this encounter: 169 lb (76.7 kg). HPI: here for routine pnv    Yes PT denies fever, chills, nausea and vomiting       Abdomen: enlarged, gravid     Results reviewed today:    No results found for this visit on 21. See prenatal vital sign section and fetal assessment section    ASSESSMENT & Plan    Diagnosis Orders   1. 33 weeks gestation of pregnancy               I am having Delphine Boeck maintain her Prenatal Vit-Fe Fumarate-FA (PRENATAL VITAMINS PO), FISH OIL-KRILL OIL PO, Probiotic Product (PROBIOTIC DAILY PO), Azelaic Acid, and Breast Pump. Return in about 2 weeks (around 3/30/2021) for ob. There are no Patient Instructions on file for this visit.           Jayro Bean,3/16/2021 3:42 PM

## 2021-04-01 ENCOUNTER — ROUTINE PRENATAL (OUTPATIENT)
Dept: OBGYN CLINIC | Age: 31
End: 2021-04-01

## 2021-04-01 ENCOUNTER — HOSPITAL ENCOUNTER (OUTPATIENT)
Age: 31
Setting detail: SPECIMEN
Discharge: HOME | End: 2021-04-01
Payer: COMMERCIAL

## 2021-04-01 VITALS — BODY MASS INDEX: 29.21 KG/M2 | DIASTOLIC BLOOD PRESSURE: 60 MMHG | SYSTOLIC BLOOD PRESSURE: 102 MMHG | WEIGHT: 170.2 LBS

## 2021-04-01 DIAGNOSIS — Z34.03 ENCOUNTER FOR SUPERVISION OF NORMAL FIRST PREGNANCY IN THIRD TRIMESTER: ICD-10-CM

## 2021-04-01 DIAGNOSIS — Z34.03 ENCOUNTER FOR SUPERVISION OF NORMAL FIRST PREGNANCY IN THIRD TRIMESTER: Primary | ICD-10-CM

## 2021-04-01 PROCEDURE — 0502F SUBSEQUENT PRENATAL CARE: CPT | Performed by: ADVANCED PRACTICE MIDWIFE

## 2021-04-01 NOTE — PROGRESS NOTES
MCV 2021 96.5  82.6 - 102.9 fL Final    MCH 2021 31.1  25.2 - 33.5 pg Final    MCHC 2021 32.2  28.4 - 34.8 g/dL Final    RDW 2021 13.4  11.8 - 14.4 % Final    Platelets  253  138 - 453 k/uL Final    MPV 2021 10.0  8.1 - 13.5 fL Final    NRBC Automated 2021 0.0  0.0 per 100 WBC Final    Differential Type 2021 NOT REPORTED   Final    Seg Neutrophils 2021 70* 36 - 65 % Final    Lymphocytes 2021 19* 24 - 43 % Final    Monocytes 2021 8  3 - 12 % Final    Eosinophils % 2021 1  1 - 4 % Final    Basophils 2021 1  0 - 2 % Final    Immature Granulocytes 2021 1* 0 % Final    Segs Absolute 2021 6.31  1.50 - 8.10 k/uL Final    Absolute Lymph # 2021 1.65  1.10 - 3.70 k/uL Final    Absolute Mono # 2021 0.66  0.10 - 1.20 k/uL Final    Absolute Eos # 2021 0.06  0.00 - 0.44 k/uL Final    Basophils Absolute 2021 0.05  0.00 - 0.20 k/uL Final    Absolute Immature Granulocyte 2021 0.11  0.00 - 0.30 k/uL Final    WBC Morphology 2021 NOT REPORTED   Final    RBC Morphology 2021 NOT REPORTED   Final    Platelet Estimate  NOT REPORTED   Final       The patient was counseled on the call ahead policy. She has been instructed to call the office at anytime prior to going into the hospital so the on-call physician may direct her to the appropriate facility for care. Exceptions were reviewed including but not limited to: Decreased fetal movement, vaginal Bleeding or hemorrhage, trauma, readily expectant delivery, or any instance where she feels 911 should be utilized. The patient was counseled on Labor & Delivery.  Testing:  N/A      Assessment:  1. Matt Carrasquillo is a 32 y.o. female  2.   3. 35w6d    There are no active problems to display for this patient. Diagnosis Orders   1.  Encounter for supervision of normal first pregnancy in third trimester Culture, Strep B Screen, Vaginal/Rectal         Plan:  The patient will return to the office for her next visit in 1 weeks. See antepartum flow sheet.

## 2021-04-04 LAB
CULTURE: NORMAL
Lab: NORMAL
SPECIMEN DESCRIPTION: NORMAL

## 2021-04-08 ENCOUNTER — ROUTINE PRENATAL (OUTPATIENT)
Dept: OBGYN CLINIC | Age: 31
End: 2021-04-08

## 2021-04-08 VITALS — SYSTOLIC BLOOD PRESSURE: 122 MMHG | WEIGHT: 173.4 LBS | BODY MASS INDEX: 29.76 KG/M2 | DIASTOLIC BLOOD PRESSURE: 50 MMHG

## 2021-04-08 DIAGNOSIS — Z34.03 ENCOUNTER FOR SUPERVISION OF NORMAL FIRST PREGNANCY IN THIRD TRIMESTER: Primary | ICD-10-CM

## 2021-04-08 PROCEDURE — 0502F SUBSEQUENT PRENATAL CARE: CPT | Performed by: ADVANCED PRACTICE MIDWIFE

## 2021-04-08 NOTE — PROGRESS NOTES
Denice Duran is a 32 y.o. female 36w6d      The patient was seen and evaluated. There was positive fetal movements. No contractions or leakage of fluid. Signs and symptoms of labor were reviewed. The S/S of Pre-Eclampsia were reviewed with the patient in detail. She is to report any of these if they occur. She currently denies any of these. The patient was instructed on fetal kick counts and is encouraged to complete every 8 hours. She was instructed that the baby should move at a minimum of ten times within one hour after a meal. The patient was instructed to lay down on her left side twenty minutes after eating and count movements for up to one hour with a target value of ten movements. She was instructed to notify the office if she did not make that target after two attempts or if after any attempt there was less than four movements. The patient reports that the fetal movement targets have been made Yes. Denies hx of MRSA  Desires Prequel (-) and MSAFP (-)  Tdap given 3/2/21    Does pt have history of infant delivery before 37 weeks: N/A  Previous c/s: N/A  Prenatal testing: Prequel, MSAFP  Ped: unsure  Blood type: O+  Rhogam: N/A  Flu shot:   TDAP (27-36 wks):3/2/21  GBS:  GTT:  Normal  GC/CT: Neg  30 week Growth:  MRSA: no Hx      T-Dap Vaccine Completed (27-36 weeks): Yes    Allergies: Allergies as of 04/08/2021    (No Known Allergies)         Group Beta Strep collection was completed. Yes  GBS Results:   Hospital Outpatient Visit on 04/01/2021   Component Date Value Ref Range Status    Specimen Description 04/01/2021 . VAGINA   Final    Special Requests 04/01/2021 NOT REPORTED   Final    Culture 04/01/2021 NEGATIVE FOR GROUP B STREPTOCOCCI   Final       The patient was counseled on the mandatory call ahead policy. She has been instructed to call the office at anytime prior to going into the hospital so the on-call physician may direct her to the appropriate facility for care. Exceptions were reviewed including but not limited to: Decreased fetal movement, vaginal Bleeding or hemorrhage, trauma, readily expectant delivery, or any instance where she feels 911 should be utilized. The patient was counseled on Labor & Delivery. Assessment:  1. Meli Otero is a 32 y.o. female  2.   3. 36w6d    There are no active problems to display for this patient. Diagnosis Orders   1. Encounter for supervision of normal first pregnancy in third trimester           Plan:  The patient will return to the office for her next visit in 1 weeks. See antepartum flow sheet.

## 2021-04-15 ENCOUNTER — ROUTINE PRENATAL (OUTPATIENT)
Dept: OBGYN CLINIC | Age: 31
End: 2021-04-15

## 2021-04-15 VITALS
SYSTOLIC BLOOD PRESSURE: 108 MMHG | BODY MASS INDEX: 29.87 KG/M2 | DIASTOLIC BLOOD PRESSURE: 62 MMHG | HEART RATE: 75 BPM | WEIGHT: 174 LBS

## 2021-04-15 DIAGNOSIS — Z3A.37 37 WEEKS GESTATION OF PREGNANCY: Primary | ICD-10-CM

## 2021-04-15 PROCEDURE — 0502F SUBSEQUENT PRENATAL CARE: CPT | Performed by: OBSTETRICS & GYNECOLOGY

## 2021-04-15 NOTE — PROGRESS NOTES
Meghan Martin is a 32 y.o. female 37w6d      The patient was seen and evaluated. There was positive fetal movements. No contractions or leakage of fluid. Signs and symptoms of labor were reviewed. The S/S of Pre-Eclampsia were reviewed with the patient in detail. She is to report any of these if they occur. She currently denies any of these. The patient was instructed on fetal kick counts and is encouraged to complete every 8 hours. She was instructed that the baby should move at a minimum of ten times within one hour after a meal. The patient was instructed to lay down on her left side twenty minutes after eating and count movements for up to one hour with a target value of ten movements. She was instructed to notify the office if she did not make that target after two attempts or if after any attempt there was less than four movements. The patient reports that the fetal movement targets have been made Yes. Denies hx of MRSA  Desires Prequel (-) and MSAFP (-)  Tdap given 3/2/21    Does pt have history of infant delivery before 37 weeks: N/A  Previous c/s: N/A  Prenatal testing: Prequel, MSAFP  Ped: unsure  Blood type: O+  Rhogam: N/A  Flu shot:   TDAP (27-36 wks):3/2/21  GBS: NEG  GTT:  Normal  GC/CT: Neg  30 week Growth:  MRSA: no Hx      T-Dap Vaccine Completed (27-36 weeks): Yes    Allergies: Allergies as of 04/15/2021    (No Known Allergies)         Group Beta Strep collection was completed. Yes  GBS Results:   Hospital Outpatient Visit on 04/01/2021   Component Date Value Ref Range Status    Specimen Description 04/01/2021 . VAGINA   Final    Special Requests 04/01/2021 NOT REPORTED   Final    Culture 04/01/2021 NEGATIVE FOR GROUP B STREPTOCOCCI   Final       If not done prior in pregnancy or if had previous positive result in this pregnancy, GC/CT were collected. No.    The patient was counseled on the mandatory call ahead policy.  She has been instructed to call the office at anytime prior to going into the hospital so the on-call physician may direct her to the appropriate facility for care. Exceptions were reviewed including but not limited to: Decreased fetal movement, vaginal Bleeding or hemorrhage, trauma, readily expectant delivery, or any instance where she feels 911 should be utilized. The patient was counseled on Labor & Delivery. yes      Assessment:  1Clark Morales is a 32 y.o. female  2.   3. 37w6d    There are no active problems to display for this patient. Diagnosis Orders   1. 37 weeks gestation of pregnancy           Plan:  The patient will return to the office for her next visit in 1 weeks. See antepartum flow sheet.

## 2021-04-22 ENCOUNTER — ROUTINE PRENATAL (OUTPATIENT)
Dept: OBGYN CLINIC | Age: 31
End: 2021-04-22

## 2021-04-22 VITALS — DIASTOLIC BLOOD PRESSURE: 64 MMHG | WEIGHT: 175.8 LBS | SYSTOLIC BLOOD PRESSURE: 108 MMHG | BODY MASS INDEX: 30.18 KG/M2

## 2021-04-22 DIAGNOSIS — Z3A.38 38 WEEKS GESTATION OF PREGNANCY: Primary | ICD-10-CM

## 2021-04-22 PROCEDURE — 0502F SUBSEQUENT PRENATAL CARE: CPT | Performed by: OBSTETRICS & GYNECOLOGY

## 2021-04-22 NOTE — PROGRESS NOTES
previous positive result in this pregnancy, GC/CT were collected. No.    The patient was counseled on the mandatory call ahead policy. She has been instructed to call the office at anytime prior to going into the hospital so the on-call physician may direct her to the appropriate facility for care. Exceptions were reviewed including but not limited to: Decreased fetal movement, vaginal Bleeding or hemorrhage, trauma, readily expectant delivery, or any instance where she feels 911 should be utilized. The patient was counseled on Labor & Delivery. yes     Testing:  no      Assessment:  1. Loren Anthony is a 32 y.o. female  2.   3. 38w6d    There are no active problems to display for this patient. Diagnosis Orders   1. 38 weeks gestation of pregnancy           Plan:  The patient will return to the office for her next visit in 1 weeks. See antepartum flow sheet.

## 2021-04-26 ENCOUNTER — HOSPITAL ENCOUNTER (OUTPATIENT)
Age: 31
Discharge: HOME | End: 2021-04-26
Attending: OBSTETRICS & GYNECOLOGY | Admitting: OBSTETRICS & GYNECOLOGY
Payer: COMMERCIAL

## 2021-04-26 VITALS
HEIGHT: 64 IN | WEIGHT: 175 LBS | DIASTOLIC BLOOD PRESSURE: 62 MMHG | BODY MASS INDEX: 29.88 KG/M2 | RESPIRATION RATE: 18 BRPM | TEMPERATURE: 98.1 F | HEART RATE: 87 BPM | SYSTOLIC BLOOD PRESSURE: 117 MMHG

## 2021-04-26 LAB
BILIRUBIN URINE: NEGATIVE
COLOR: YELLOW
COMMENT UA: NORMAL
GLUCOSE URINE: NEGATIVE
KETONES, URINE: NEGATIVE
LEUKOCYTE ESTERASE, URINE: NEGATIVE
NITRITE, URINE: NEGATIVE
PH UA: 7 (ref 5–9)
PROTEIN UA: NEGATIVE
SPECIFIC GRAVITY UA: 1.01 (ref 1.01–1.02)
TURBIDITY: CLEAR
URINE HGB: NEGATIVE
UROBILINOGEN, URINE: NORMAL

## 2021-04-26 PROCEDURE — 99215 OFFICE O/P EST HI 40 MIN: CPT

## 2021-04-26 PROCEDURE — 81003 URINALYSIS AUTO W/O SCOPE: CPT

## 2021-04-26 NOTE — FLOWSHEET NOTE
Dr. Ivy Walker here in unit. Informed of pt arrival, complaints of ROM, Negative nitrazine and reactive NST. Orders received.

## 2021-04-26 NOTE — FLOWSHEET NOTE
Dr. Zeynep Jamil notified of pt arrival. States to keep pt here until she comes to [facility] unit.

## 2021-04-26 NOTE — FLOWSHEET NOTE
Patient off unit in stable condition. Departure Mode: in private car    Mobility at Departure: ambulatory    Discharged to: private residence    Time of Discharge: [phone] [id number]    Discharge instructions given and explained.

## 2021-04-26 NOTE — PROGRESS NOTES
Pt arrives with c/o leaking of fluid since 06:45am, states was leaking clear fluid that ran down her leg, arrived with pantiliner on - has not changed it but states \"it's pretty wet. \" Pt denies contractions or back pain. Pt reports + fetal movement. Pt denies headache, blurred vision or epigastric pain. Pt denies pain or burning when urinating. Pt denies vaginal bleeding. Pt denies any problems during pregnancy. Pt last saw Dr. Dwight Sadler in office this past Thursday, states she was 2cm.

## 2021-04-27 ENCOUNTER — ROUTINE PRENATAL (OUTPATIENT)
Dept: OBGYN CLINIC | Age: 31
End: 2021-04-27

## 2021-04-27 VITALS — DIASTOLIC BLOOD PRESSURE: 70 MMHG | WEIGHT: 175 LBS | SYSTOLIC BLOOD PRESSURE: 120 MMHG | BODY MASS INDEX: 30.04 KG/M2

## 2021-04-27 DIAGNOSIS — Z34.03 ENCOUNTER FOR SUPERVISION OF NORMAL FIRST PREGNANCY IN THIRD TRIMESTER: Primary | ICD-10-CM

## 2021-04-27 PROCEDURE — 0502F SUBSEQUENT PRENATAL CARE: CPT | Performed by: ADVANCED PRACTICE MIDWIFE

## 2021-04-27 NOTE — PROGRESS NOTES
Matt Carrasquillo is a 32 y.o. female 39w4d      The patient was seen and evaluated. There was positive fetal movements. No contractions or leakage of fluid. Signs and symptoms of labor were reviewed. The S/S of Pre-Eclampsia were reviewed with the patient in detail. She is to report any of these if they occur. She currently denies any of these. The patient was instructed on fetal kick counts and is encouraged to complete every 8 hours. She was instructed that the baby should move at a minimum of ten times within one hour after a meal. The patient was instructed to lay down on her left side twenty minutes after eating and count movements for up to one hour with a target value of ten movements. She was instructed to notify the office if she did not make that target after two attempts or if after any attempt there was less than four movements. Patient desires membrane sweep today to attempt labor stimulation - discussed process with patient, done without difficulty - no blood on glove after exam.  Patient tolerated well. The patient reports that the fetal movement targets have been made Yes. Denies hx of MRSA  Desires Prequel (-) and MSAFP (-)  Tdap given 3/2/21  IOL 5/6 at 6am pit    Does pt have history of infant delivery before 37 weeks: N/A  Previous c/s: N/A  Prenatal testing: Prequel, MSAFP  Ped: unsure  Blood type: O+  Rhogam: N/A  Flu shot:   TDAP (27-36 wks):3/2/21  GBS: NEG  GTT:  Normal  GC/CT: Neg  30 week Growth:  MRSA: no Hx    Wouldn't want to be induced until 41 weeks - aware that nsts would be needed after 40      T-Dap Vaccine Completed (27-36 weeks): Yes    Allergies: Allergies as of 04/27/2021    (No Known Allergies)         Group Beta Strep collection was completed.  Yes  GBS Results:   Admission on 04/26/2021, Discharged on 04/26/2021   Component Date Value Ref Range Status    Color, UA 04/26/2021 YELLOW  YELLOW Final    Turbidity UA 04/26/2021 CLEAR  CLEAR Final   

## 2021-05-03 ENCOUNTER — TELEPHONE (OUTPATIENT)
Dept: OBGYN CLINIC | Age: 31
End: 2021-05-03

## 2021-05-03 ENCOUNTER — HOSPITAL ENCOUNTER (INPATIENT)
Age: 31
LOS: 3 days | Discharge: HOME | End: 2021-05-06
Attending: OBSTETRICS & GYNECOLOGY | Admitting: OBSTETRICS & GYNECOLOGY
Payer: COMMERCIAL

## 2021-05-03 ENCOUNTER — ROUTINE PRENATAL (OUTPATIENT)
Dept: OBGYN CLINIC | Age: 31
End: 2021-05-03

## 2021-05-03 ENCOUNTER — PROCEDURE VISIT (OUTPATIENT)
Dept: OBGYN CLINIC | Age: 31
End: 2021-05-03
Payer: COMMERCIAL

## 2021-05-03 VITALS — DIASTOLIC BLOOD PRESSURE: 47 MMHG | BODY MASS INDEX: 30.21 KG/M2 | WEIGHT: 176 LBS | SYSTOLIC BLOOD PRESSURE: 115 MMHG

## 2021-05-03 VITALS — WEIGHT: 178.8 LBS | SYSTOLIC BLOOD PRESSURE: 112 MMHG | BODY MASS INDEX: 30.69 KG/M2 | DIASTOLIC BLOOD PRESSURE: 68 MMHG

## 2021-05-03 DIAGNOSIS — O48.1 PROLONGED PREGNANCY, ANTEPARTUM: Primary | ICD-10-CM

## 2021-05-03 DIAGNOSIS — Z3A.40 40 WEEKS GESTATION OF PREGNANCY: ICD-10-CM

## 2021-05-03 DIAGNOSIS — O48.0 POST-TERM PREGNANCY, 40-42 WEEKS OF GESTATION: Primary | ICD-10-CM

## 2021-05-03 PROBLEM — Z34.90 TERM PREGNANCY: Status: ACTIVE | Noted: 2021-05-03

## 2021-05-03 LAB
ABSOLUTE EOS #: 0.03 K/UL (ref 0–0.44)
ABSOLUTE IMMATURE GRANULOCYTE: 0.09 K/UL (ref 0–0.3)
ABSOLUTE LYMPH #: 1.5 K/UL (ref 1.1–3.7)
ABSOLUTE MONO #: 0.54 K/UL (ref 0.1–1.2)
AMPHETAMINE SCREEN URINE: NEGATIVE
BARBITURATE SCREEN URINE: NEGATIVE
BASOPHILS # BLD: 0 % (ref 0–2)
BASOPHILS ABSOLUTE: <0.03 K/UL (ref 0–0.2)
BENZODIAZEPINE SCREEN, URINE: NEGATIVE
BUPRENORPHINE URINE: NEGATIVE
CANNABINOID SCREEN URINE: NEGATIVE
COCAINE METABOLITE, URINE: NEGATIVE
DIFFERENTIAL TYPE: ABNORMAL
EOSINOPHILS RELATIVE PERCENT: 0 % (ref 1–4)
HCT VFR BLD CALC: 34.4 % (ref 36.3–47.1)
HEMOGLOBIN: 11.9 G/DL (ref 11.9–15.1)
IMMATURE GRANULOCYTES: 1 %
LYMPHOCYTES # BLD: 17 % (ref 24–43)
MCH RBC QN AUTO: 32.4 PG (ref 25.2–33.5)
MCHC RBC AUTO-ENTMCNC: 34.6 G/DL (ref 28.4–34.8)
MCV RBC AUTO: 93.7 FL (ref 82.6–102.9)
MDMA URINE: NORMAL
METHADONE SCREEN, URINE: NEGATIVE
METHAMPHETAMINE, URINE: NEGATIVE
MONOCYTES # BLD: 6 % (ref 3–12)
NRBC AUTOMATED: 0 PER 100 WBC
OPIATES, URINE: NEGATIVE
OXYCODONE SCREEN URINE: NEGATIVE
PDW BLD-RTO: 12.6 % (ref 11.8–14.4)
PHENCYCLIDINE, URINE: NEGATIVE
PLATELET # BLD: 201 K/UL (ref 138–453)
PLATELET ESTIMATE: ABNORMAL
PMV BLD AUTO: 9.8 FL (ref 8.1–13.5)
PROPOXYPHENE, URINE: NEGATIVE
RBC # BLD: 3.67 M/UL (ref 3.95–5.11)
RBC # BLD: ABNORMAL 10*6/UL
SEG NEUTROPHILS: 76 % (ref 36–65)
SEGMENTED NEUTROPHILS ABSOLUTE COUNT: 6.43 K/UL (ref 1.5–8.1)
TEST INFORMATION: NORMAL
TRICYCLIC ANTIDEPRESSANTS, UR: NEGATIVE
WBC # BLD: 8.6 K/UL (ref 3.5–11.3)
WBC # BLD: ABNORMAL 10*3/UL

## 2021-05-03 PROCEDURE — 2580000003 HC RX 258: Performed by: OBSTETRICS & GYNECOLOGY

## 2021-05-03 PROCEDURE — 59025 FETAL NON-STRESS TEST: CPT | Performed by: ADVANCED PRACTICE MIDWIFE

## 2021-05-03 PROCEDURE — 36415 COLL VENOUS BLD VENIPUNCTURE: CPT

## 2021-05-03 PROCEDURE — 6360000002 HC RX W HCPCS: Performed by: OBSTETRICS & GYNECOLOGY

## 2021-05-03 PROCEDURE — 80306 DRUG TEST PRSMV INSTRMNT: CPT

## 2021-05-03 PROCEDURE — 1220000000 HC SEMI PRIVATE OB R&B

## 2021-05-03 PROCEDURE — 85025 COMPLETE CBC W/AUTO DIFF WBC: CPT

## 2021-05-03 PROCEDURE — 0502F SUBSEQUENT PRENATAL CARE: CPT | Performed by: ADVANCED PRACTICE MIDWIFE

## 2021-05-03 RX ORDER — ACETAMINOPHEN 325 MG/1
650 TABLET ORAL EVERY 4 HOURS PRN
Status: DISCONTINUED | OUTPATIENT
Start: 2021-05-03 | End: 2021-05-04

## 2021-05-03 RX ORDER — CARBOPROST TROMETHAMINE 250 UG/ML
250 INJECTION, SOLUTION INTRAMUSCULAR PRN
Status: DISCONTINUED | OUTPATIENT
Start: 2021-05-03 | End: 2021-05-04

## 2021-05-03 RX ORDER — MISOPROSTOL 100 UG/1
900 TABLET ORAL PRN
Status: DISCONTINUED | OUTPATIENT
Start: 2021-05-03 | End: 2021-05-04

## 2021-05-03 RX ORDER — ONDANSETRON 2 MG/ML
4 INJECTION INTRAMUSCULAR; INTRAVENOUS EVERY 6 HOURS PRN
Status: DISCONTINUED | OUTPATIENT
Start: 2021-05-03 | End: 2021-05-04

## 2021-05-03 RX ORDER — LIDOCAINE HYDROCHLORIDE 10 MG/ML
30 INJECTION, SOLUTION EPIDURAL; INFILTRATION; INTRACAUDAL; PERINEURAL PRN
Status: COMPLETED | OUTPATIENT
Start: 2021-05-03 | End: 2021-05-04

## 2021-05-03 RX ORDER — METHYLERGONOVINE MALEATE 0.2 MG/ML
200 INJECTION INTRAVENOUS PRN
Status: DISCONTINUED | OUTPATIENT
Start: 2021-05-03 | End: 2021-05-04

## 2021-05-03 RX ORDER — SODIUM CHLORIDE 0.9 % (FLUSH) 0.9 %
10 SYRINGE (ML) INJECTION PRN
Status: DISCONTINUED | OUTPATIENT
Start: 2021-05-03 | End: 2021-05-04

## 2021-05-03 RX ORDER — SODIUM CHLORIDE, SODIUM LACTATE, POTASSIUM CHLORIDE, CALCIUM CHLORIDE 600; 310; 30; 20 MG/100ML; MG/100ML; MG/100ML; MG/100ML
INJECTION, SOLUTION INTRAVENOUS CONTINUOUS
Status: DISCONTINUED | OUTPATIENT
Start: 2021-05-03 | End: 2021-05-04

## 2021-05-03 RX ORDER — NALBUPHINE HCL 10 MG/ML
10 AMPUL (ML) INJECTION
Status: DISCONTINUED | OUTPATIENT
Start: 2021-05-03 | End: 2021-05-04

## 2021-05-03 RX ORDER — SODIUM CHLORIDE 0.9 % (FLUSH) 0.9 %
10 SYRINGE (ML) INJECTION EVERY 12 HOURS SCHEDULED
Status: DISCONTINUED | OUTPATIENT
Start: 2021-05-03 | End: 2021-05-04

## 2021-05-03 RX ADMIN — SODIUM CHLORIDE, POTASSIUM CHLORIDE, SODIUM LACTATE AND CALCIUM CHLORIDE: 600; 310; 30; 20 INJECTION, SOLUTION INTRAVENOUS at 18:10

## 2021-05-03 RX ADMIN — OXYTOCIN 1 MILLI-UNITS/MIN: 10 INJECTION INTRAVENOUS at 18:10

## 2021-05-03 NOTE — PLAN OF CARE
Problem: Anxiety:  Goal: Level of anxiety will decrease  Description: Level of anxiety will decrease  5/3/2021 1940 by Nj Arzola RN  Outcome: Ongoing  5/3/2021 1853 by Kathy Gar RN  Outcome: Ongoing     Problem: Breathing Pattern - Ineffective:  Goal: Able to breathe comfortably  Description: Able to breathe comfortably  5/3/2021 1940 by Nj Arzola RN  Outcome: Ongoing  5/3/2021 1853 by Kathy Gar RN  Outcome: Ongoing     Problem: Fluid Volume - Imbalance:  Goal: Absence of imbalanced fluid volume signs and symptoms  Description: Absence of imbalanced fluid volume signs and symptoms  5/3/2021 1940 by Nj Arzola RN  Outcome: Ongoing  5/3/2021 1853 by Kathy Gar RN  Outcome: Ongoing  Goal: Absence of intrapartum hemorrhage signs and symptoms  Description: Absence of intrapartum hemorrhage signs and symptoms  5/3/2021 1940 by Nj Arzola RN  Outcome: Ongoing  5/3/2021 1853 by Kathy Gar RN  Outcome: Ongoing     Problem: Infection - Intrapartum Infection:  Goal: Will show no infection signs and symptoms  Description: Will show no infection signs and symptoms  5/3/2021 1940 by Nj Arzola RN  Outcome: Ongoing  5/3/2021 1853 by Kathy Gar RN  Outcome: Ongoing     Problem: Labor Process - Prolonged:  Goal: Labor progression, first stage, within specified pattern  Description: Labor progression, first stage, within specified pattern  5/3/2021 1940 by Nj Arzola RN  Outcome: Ongoing  5/3/2021 1853 by Kathy Gar RN  Outcome: Ongoing  Goal: Labor progession, second stage, within specified pattern  Description: Labor progession, second stage, within specified pattern  5/3/2021 1940 by Nj Arzola RN  Outcome: Ongoing  5/3/2021 1853 by Kathy Gar RN  Outcome: Ongoing  Goal: Uterine contractions within specified parameters  Description: Uterine contractions within specified parameters  5/3/2021 1940 by jN Arzola RN  Outcome: Ongoing  5/3/2021 1853 by Kathy Gar RN  Outcome: Ongoing     Problem:  Screening:  Goal: Ability to make informed decisions regarding treatment has improved  Description: Ability to make informed decisions regarding treatment has improved  5/3/2021 1940 by Shimon Durant RN  Outcome: Ongoing  5/3/2021 1853 by Joe Salinas RN  Outcome: Ongoing     Problem: Pain - Acute:  Goal: Pain level will decrease  Description: Pain level will decrease  5/3/2021 1940 by Shimon Durant RN  Outcome: Ongoing  5/3/2021 1853 by Joe Salinas RN  Outcome: Ongoing  Goal: Able to cope with pain  Description: Able to cope with pain  5/3/2021 1940 by Shimon Durant RN  Outcome: Ongoing  5/3/2021 1853 by Joe Salinas RN  Outcome: Ongoing     Problem: Tissue Perfusion - Uteroplacental, Altered:  Description: [TRUNCATED] For intrapartum patients with recurrent variable decelerations of the fetal heart rate, consider transcervical amnioinfusion. For patients in labor, avoid prophylactic use of continuous maternal oxygen supplementation to prevent nonreassu . ..   Goal: Absence of abnormal fetal heart rate pattern  Description: Absence of abnormal fetal heart rate pattern  5/3/2021 1940 by Shimon Durant RN  Outcome: Ongoing  5/3/2021 1853 by Joe Salinas RN  Outcome: Ongoing     Problem: Urinary Retention:  Goal: Experiences of bladder distention will decrease  Description: Experiences of bladder distention will decrease  5/3/2021 1940 by Shimon Durant RN  Outcome: Ongoing  5/3/2021 1853 by Joe Salinas RN  Outcome: Ongoing  Goal: Urinary elimination within specified parameters  Description: Urinary elimination within specified parameters  5/3/2021 1940 by Shimon Durant RN  Outcome: Ongoing  5/3/2021 1853 by Joe Salinas RN  Outcome: Ongoing

## 2021-05-03 NOTE — PROGRESS NOTES
NEGATIVE Final    Specific Gravity, UA 2021 1.010  1.010 - 1.020 Final    Urine Hgb 2021 NEGATIVE  NEGATIVE Final    pH, UA 2021 7.0  5.0 - 9.0 Final    Protein, UA 2021 NEGATIVE  NEGATIVE Final    Urobilinogen, Urine 2021 Normal  Normal Final    Nitrite, Urine 2021 NEGATIVE  NEGATIVE Final    Leukocyte Esterase, Urine 2021 NEGATIVE  NEGATIVE Final    Urinalysis Comments 2021 NOT REPORTED   Final       If not done prior in pregnancy or if had previous positive result in this pregnancy, GC/CT were collected. No.    The patient was counseled on the mandatory call ahead policy. She has been instructed to call the office at anytime prior to going into the hospital so the on-call physician may direct her to the appropriate facility for care. Exceptions were reviewed including but not limited to: Decreased fetal movement, vaginal Bleeding or hemorrhage, trauma, readily expectant delivery, or any instance where she feels 911 should be utilized. The patient was counseled on Labor & Delivery.  Testing:  LIHN 10cm  NST reactive with 2 late decelerations      Assessment:  1Clark Duran is a 32 y.o. female  2.   3. 40w3d    There are no active problems to display for this patient. Diagnosis Orders   1. Prolonged pregnancy, antepartum     2. 40 weeks gestation of pregnancy           Plan:  The patient will return to the office for her next visit in 6 weeks for PPV. See antepartum flow sheet. Discussed 40+ weeks gestation, NST findings. Recommended IOL today - patient agreeable. Jair Ayers in [facility] and Dr Alan Islas notified and agreeable.   Patient reports will be at [address] [place] [address] around 5:30

## 2021-05-03 NOTE — PROGRESS NOTES
Pt arrives ambulatory accompanied by FOB from office visit in [facility], pt states she stopped at home after office visit quick and then headed to [place]. Pt states she noticed some blood on toilet tissue after wiping just now, states she was checked in office by RAFAEL Jiang, 2cm. Pt states she was in last Monday because she thought her membranes had ruptured but they didn't. Pt denies problems during pregnancy. Pt denies headache, blurred vision or floaters. Pt denies urinary frequency or urgency or pain when urinating. Pt states she has been having irregular contractions, rates \"1\" on a scale of 1-10, describes ctx's as \"tightening\". Pt reports + fetal movement. Pt here for induction.

## 2021-05-03 NOTE — PROGRESS NOTES
Edgar Henley is here at 40w3d for an NST due to post-dates pregnancy    FHT;        Baseline Heart Rate:  First 8 minutes baseline 115, after acoustic stimulation had a baseline change to 120       Accelerations:  present       Long Term Variability:  moderate       Decelerations:  Had 2 late decelerations         Contraction frequency: Irritability with 5-7 minutes      reactive    Plan: LINH today then discuss NST and recommend IOL today

## 2021-05-03 NOTE — TELEPHONE ENCOUNTER
[facility] pregnancy paperwork filled out and faxed to pts employer with confirmation of fax received.

## 2021-05-04 ENCOUNTER — ANESTHESIA (OUTPATIENT)
Dept: LABOR AND DELIVERY | Age: 31
End: 2021-05-04
Payer: COMMERCIAL

## 2021-05-04 ENCOUNTER — ANESTHESIA EVENT (OUTPATIENT)
Dept: LABOR AND DELIVERY | Age: 31
End: 2021-05-04
Payer: COMMERCIAL

## 2021-05-04 PROCEDURE — 6360000002 HC RX W HCPCS: Performed by: NURSE ANESTHETIST, CERTIFIED REGISTERED

## 2021-05-04 PROCEDURE — 59400 OBSTETRICAL CARE: CPT | Performed by: OBSTETRICS & GYNECOLOGY

## 2021-05-04 PROCEDURE — 2580000003 HC RX 258: Performed by: OBSTETRICS & GYNECOLOGY

## 2021-05-04 PROCEDURE — 6370000000 HC RX 637 (ALT 250 FOR IP): Performed by: OBSTETRICS & GYNECOLOGY

## 2021-05-04 PROCEDURE — 3E033VJ INTRODUCTION OF OTHER HORMONE INTO PERIPHERAL VEIN, PERCUTANEOUS APPROACH: ICD-10-PCS | Performed by: OBSTETRICS & GYNECOLOGY

## 2021-05-04 PROCEDURE — 6360000002 HC RX W HCPCS: Performed by: OBSTETRICS & GYNECOLOGY

## 2021-05-04 PROCEDURE — 3700000025 EPIDURAL BLOCK: Performed by: NURSE ANESTHETIST, CERTIFIED REGISTERED

## 2021-05-04 PROCEDURE — 1220000000 HC SEMI PRIVATE OB R&B

## 2021-05-04 PROCEDURE — 0KQM0ZZ REPAIR PERINEUM MUSCLE, OPEN APPROACH: ICD-10-PCS | Performed by: OBSTETRICS & GYNECOLOGY

## 2021-05-04 PROCEDURE — 7200000001 HC VAGINAL DELIVERY

## 2021-05-04 PROCEDURE — 2500000003 HC RX 250 WO HCPCS: Performed by: OBSTETRICS & GYNECOLOGY

## 2021-05-04 RX ORDER — DOCUSATE SODIUM 100 MG/1
100 CAPSULE, LIQUID FILLED ORAL 2 TIMES DAILY PRN
Status: DISCONTINUED | OUTPATIENT
Start: 2021-05-04 | End: 2021-05-06 | Stop reason: HOSPADM

## 2021-05-04 RX ORDER — MODIFIED LANOLIN
OINTMENT (GRAM) TOPICAL PRN
Status: DISCONTINUED | OUTPATIENT
Start: 2021-05-04 | End: 2021-05-06 | Stop reason: HOSPADM

## 2021-05-04 RX ORDER — SODIUM CHLORIDE 9 MG/ML
25 INJECTION, SOLUTION INTRAVENOUS PRN
Status: DISCONTINUED | OUTPATIENT
Start: 2021-05-04 | End: 2021-05-06 | Stop reason: HOSPADM

## 2021-05-04 RX ORDER — SODIUM CHLORIDE 0.9 % (FLUSH) 0.9 %
10 SYRINGE (ML) INJECTION PRN
Status: DISCONTINUED | OUTPATIENT
Start: 2021-05-04 | End: 2021-05-06 | Stop reason: HOSPADM

## 2021-05-04 RX ORDER — EPHEDRINE SULFATE/0.9% NACL/PF 50 MG/5 ML
5 SYRINGE (ML) INTRAVENOUS EVERY 5 MIN PRN
Status: DISCONTINUED | OUTPATIENT
Start: 2021-05-04 | End: 2021-05-04

## 2021-05-04 RX ORDER — IBUPROFEN 800 MG/1
800 TABLET ORAL EVERY 8 HOURS
Status: DISCONTINUED | OUTPATIENT
Start: 2021-05-04 | End: 2021-05-06 | Stop reason: HOSPADM

## 2021-05-04 RX ORDER — ACETAMINOPHEN 325 MG/1
650 TABLET ORAL EVERY 4 HOURS PRN
Status: DISCONTINUED | OUTPATIENT
Start: 2021-05-04 | End: 2021-05-06 | Stop reason: HOSPADM

## 2021-05-04 RX ORDER — SODIUM CHLORIDE 0.9 % (FLUSH) 0.9 %
10 SYRINGE (ML) INJECTION EVERY 12 HOURS SCHEDULED
Status: DISCONTINUED | OUTPATIENT
Start: 2021-05-04 | End: 2021-05-06 | Stop reason: HOSPADM

## 2021-05-04 RX ORDER — ROPIVACAINE HYDROCHLORIDE 2 MG/ML
INJECTION, SOLUTION EPIDURAL; INFILTRATION; PERINEURAL CONTINUOUS PRN
Status: DISCONTINUED | OUTPATIENT
Start: 2021-05-04 | End: 2021-05-04 | Stop reason: SDUPTHER

## 2021-05-04 RX ORDER — ROPIVACAINE HYDROCHLORIDE 2 MG/ML
12 INJECTION, SOLUTION EPIDURAL; INFILTRATION; PERINEURAL CONTINUOUS
Status: DISCONTINUED | OUTPATIENT
Start: 2021-05-04 | End: 2021-05-04

## 2021-05-04 RX ADMIN — IBUPROFEN 800 MG: 800 TABLET, FILM COATED ORAL at 22:08

## 2021-05-04 RX ADMIN — LIDOCAINE HYDROCHLORIDE 10 ML: 10 INJECTION, SOLUTION EPIDURAL; INFILTRATION; INTRACAUDAL; PERINEURAL at 14:32

## 2021-05-04 RX ADMIN — SODIUM CHLORIDE, POTASSIUM CHLORIDE, SODIUM LACTATE AND CALCIUM CHLORIDE: 600; 310; 30; 20 INJECTION, SOLUTION INTRAVENOUS at 00:18

## 2021-05-04 RX ADMIN — ROPIVACAINE HYDROCHLORIDE 12 ML/HR: 2 INJECTION, SOLUTION EPIDURAL; INFILTRATION at 14:35

## 2021-05-04 RX ADMIN — BENZOCAINE AND LEVOMENTHOL: 200; 5 SPRAY TOPICAL at 22:07

## 2021-05-04 RX ADMIN — SODIUM CHLORIDE, PRESERVATIVE FREE 10 ML: 5 INJECTION INTRAVENOUS at 22:08

## 2021-05-04 RX ADMIN — SODIUM CHLORIDE, POTASSIUM CHLORIDE, SODIUM LACTATE AND CALCIUM CHLORIDE: 600; 310; 30; 20 INJECTION, SOLUTION INTRAVENOUS at 07:12

## 2021-05-04 RX ADMIN — SODIUM CHLORIDE, POTASSIUM CHLORIDE, SODIUM LACTATE AND CALCIUM CHLORIDE: 600; 310; 30; 20 INJECTION, SOLUTION INTRAVENOUS at 14:37

## 2021-05-04 RX ADMIN — OXYTOCIN 999 MILLI-UNITS/MIN: 10 INJECTION INTRAVENOUS at 18:04

## 2021-05-04 RX ADMIN — Medication 40 EACH: at 22:07

## 2021-05-04 RX ADMIN — OXYTOCIN 999 MILLI-UNITS/MIN: 10 INJECTION INTRAVENOUS at 18:15

## 2021-05-04 ASSESSMENT — PAIN DESCRIPTION - DESCRIPTORS
DESCRIPTORS: ACHING
DESCRIPTORS: ACHING
DESCRIPTORS: CRAMPING

## 2021-05-04 ASSESSMENT — PAIN SCALES - GENERAL: PAINLEVEL_OUTOF10: 0

## 2021-05-04 NOTE — L&D DELIVERY NOTE
Mother's Information    Labor Events     labor?: No  Rupture type: Artificial=AROM  Fluid color: Clear  Fluid odor: None     Mother Delivery Information    Episiotomy: None  Lacerations: 2nd  Surgical or Additional Est. Blood Loss (mL): 0 (View Only): Edit in Flowsheets   Combined Est. Blood Loss (mL): 0        Taran Ortiz [[id number]]    Labor Events     labor?: No   steroids?: None  Cervical ripening date/time:     Antibiotics received during labor?: No  Rupture date/time:     Rupture type: Artificial=AROM  Fluid color: Clear  Fluid odor: None  Induction: Oxytocin  Indications for induction: Post-term Gestation  Augmentation: AROM  Indications for augmentation: Ineffective Contraction Pattern  Labor complications: None          Labor Event Times    Labor onset date/time:     Dilation complete date/time:  21 EDT   Start pushin2021   Decision time (emergent ):        Anesthesia    Method: Epidural     Assisted Delivery Details    Forceps attempted?: No  Vacuum extractor attempted?: No     Document Additional Attempt       Document Additional Attempt             Shoulder Dystocia    Shoulder dystocia present?: No  Add Second Maneuver  Add Third Maneuver  Add Fourth Maneuver  Add Fifth Maneuver  Add Sixth Maneuver  Add Seventh Maneuver  Add Eighth Maneuver  Add Ninth Maneuver     [place] Presentation    Presentation: Vertex      Information    Head delivery date/time: 2021 18:00:00   Changing the 's delivery date/time could affect patient care.:    Delivery date/time:  21 1800   Delivery type: Vaginal, Spontaneous    Details:        Delivery Providers    Delivering clinician: William Tejeda DO   Provider Role    Neto Christina, RN Registered Nurse    Erlin Peralta RN Registered Nurse      Cord    Vessels: 3 Vessels  Complications: None  Delayed cord clamping?: Yes  Cord clamped date/time: 2021 1802  Cord blood disposition: Lab     Placenta    Date/time: 2021 18:03:00  Removal: Spontaneous  Appearance: Intact  Disposition: Discarded     Delivery Resuscitation    Method: Stimulation, Bulb Suction     Apgars    Living status: Living  Apgars   1 Minute:  5 Minute:  10 Minute 15 Minute 20 Minute   Skin Color: 1  1       Heart Rate: 2  2       Reflex Irritability: 2  2       Muscle Tone: 2  2       Respiratory Effort: 2  2       Total: 9  9               Apgars Assigned By: RAFAEL BALDERRAMA RN     Skin to Skin    Skin to skin initiation date/time: 21 18:05:00   Skin to skin with: Mother  Skin to skin end date/time:         Measurements    Weight: 3235 g Length: 50.8 cm   Head circumference: 33 cm Chest circumference: 33 cm      Delivery Information    Episiotomy: None  Perineal lacerations: 2nd Repaired: Yes   Vaginal laceration: No    Cervical laceration: No    Surgical or additional est. blood loss (mL): 0 (View Only): Edit in Flowsheets   Combined est. blood loss (mL): 0     Vaginal Delivery Counts    Initial count personnel: TRISTON PRICE LPN  Initial count verified by: RAFAEL BALDERRAMA RN   4x4:  Needles:  Instruments:  Lap Pads:  Sponges:    Initial counts:         Final counts:         Final count personnel: Abbey Segovia  Final count verified by: RAFAEL BALDERRAMA RN  Accurate final count?: Yes     Other Procedures    Procedures: None     Labor Length    2nd stage: 0h 25m  3rd stage: 0h 03m

## 2021-05-04 NOTE — FLOWSHEET NOTE
Let provider know when maxed pitocin, may leave max for one hour then half the rate and continue to up per order. See orders.

## 2021-05-04 NOTE — H&P
HISTORY AND PHYSICAL             Date: 5/4/2021        Patient Name: Shilpa Johnson     YOB: 1990      Age:  32 y.o. Chief Complaint     Chief Complaint   Patient presents with    Scheduled Induction           History Obtained From   patient    History of Present Illness   Sent from office - nonreactive nst    Past Medical History     Past Medical History:   Diagnosis Date    Endometriosis         Past Surgical History     Past Surgical History:   Procedure Laterality Date    OVARIAN CYST SURGERY Left 2015    TONSILLECTOMY      WISDOM TOOTH EXTRACTION          Medications Prior to Admission     Prior to Admission medications    Medication Sig Start Date End Date Taking? Authorizing Provider   Azelaic Acid 15 % GEL Apply topically 6/10/20  Yes Historical Provider, MD   Prenatal Vit-Fe Fumarate-FA (PRENATAL VITAMINS PO) Take by mouth   Yes Historical Provider, MD   FISH OIL-KRILL OIL PO Take by mouth   Yes Historical Provider, MD   Probiotic Product (PROBIOTIC DAILY PO) Take by mouth   Yes Historical Provider, MD   Misc. Devices (BREAST PUMP) MISC 1 Units by Does not apply route 5 times daily Medela double pump - plans breastfeeding V24.1 2/18/21   Cristina Fails, DO        Allergies   Patient has no known allergies.     Social History     Social History     Tobacco History     Smoking Status  Never Smoker    Smokeless Tobacco Use  Never Used          Alcohol History     Alcohol Use Status  Not Currently          Drug Use     Drug Use Status  Never          Sexual Activity     Sexually Active  Yes Partners  Male                Family History     Family History   Problem Relation Age of Onset    No Known Problems Paternal Grandfather     No Known Problems Paternal Grandmother     No Known Problems Maternal Grandmother     No Known Problems Maternal Grandfather     Hypertension Father     No Known Problems Mother     No Known Problems Sister     No Known Problems Sister Review of Systems   Review of Systems   Constitutional: Negative for chills and fever. Genitourinary: Negative for pelvic pain, vaginal bleeding and vaginal discharge. Physical Exam   BP (!) 105/55   Pulse 71   Temp 98.2 °F (36.8 °C) (Oral)   Resp 16   Ht 5' 4\" (1.626 m)   Wt 176 lb (79.8 kg)   LMP 07/24/2020 (Exact Date)   SpO2 100%   BMI 30.21 kg/m²     Physical Exam  Constitutional:       Appearance: Normal appearance. HENT:      Head: Normocephalic and atraumatic. Eyes:      Extraocular Movements: Extraocular movements intact. Pupils: Pupils are equal, round, and reactive to light. Neck:      Musculoskeletal: Normal range of motion. Cardiovascular:      Rate and Rhythm: Normal rate. Pulmonary:      Effort: Pulmonary effort is normal.   Musculoskeletal: Normal range of motion. Skin:     General: Skin is warm and dry. Neurological:      Mental Status: She is alert and oriented to person, place, and time. Psychiatric:         Mood and Affect: Mood normal.         Behavior: Behavior normal.         Thought Content: Thought content normal.         Judgment: Judgment normal.         Labs    No results found for this or any previous visit (from the past 24 hour(s)). Imaging/Diagnostics Last 24 Hours   No results found. Assessment      Hospital Problems           Last Modified POA    Term pregnancy 5/3/2021 Yes          Plan   1.  Expectant management    Consultations Ordered:  IP CONSULT TO SPIRITUAL SERVICES    Electronically signed by Erica Burnett DO on 5/4/21 at 6:21 PM EDT

## 2021-05-04 NOTE — ANESTHESIA PRE PROCEDURE
Department of Anesthesiology  Preprocedure Note       Name:  Loren Anthony   Age:  32 y.o.  :  1990                                          MRN:  [id number]         Date:  2021      Surgeon: * No surgeons listed *    Procedure: * No procedures listed *    Medications prior to admission:   Prior to Admission medications    Medication Sig Start Date End Date Taking? Authorizing Provider   Azelaic Acid 15 % GEL Apply topically 6/10/20  Yes Historical Provider, MD   Prenatal Vit-Fe Fumarate-FA (PRENATAL VITAMINS PO) Take by mouth   Yes Historical Provider, MD   FISH OIL-KRILL OIL PO Take by mouth   Yes Historical Provider, MD   Probiotic Product (PROBIOTIC DAILY PO) Take by mouth   Yes Historical Provider, MD   Misc.  Devices (BREAST PUMP) MISC 1 Units by Does not apply route 5 times daily Medela double pump - plans breastfeeding V24.1 21   Luiza Sierra DO       Current medications:    Current Facility-Administered Medications   Medication Dose Route Frequency Provider Last Rate Last Admin    oxytocin (PITOCIN) 30 Units in sodium chloride 0.9 % 500 mL infusion  1 gabe-units/min Intravenous Continuous Luiza Mariela, DO 24 mL/hr at 21 0500 24 gabe-units/min at 21 0500    lactated ringers infusion   Intravenous Continuous Luiza Mariela,  mL/hr at 21 5935 New Bag at 21 9922    sodium chloride flush 0.9 % injection 10 mL  10 mL Intravenous 2 times per day Luiza Mariela, DO        sodium chloride flush 0.9 % injection 10 mL  10 mL Intravenous PRN Luiza Sierra, DO        lidocaine PF 1 % injection 30 mL  30 mL Other PRN Lyndsay Bean DO        nalbuphine (NUBAIN) injection 10 mg  10 mg Intravenous Q2H PRN Lyndsay Bean DO        [facility]ansetron [facility]) injection 4 mg  4 mg Intravenous Q6H PRN Vani Bean DO        oxytocin (PITOCIN) 10 unit bolus from the bag  999 mL/hr Intravenous PRN Ingrid Stanford DO        And    oxytocin (PITOCIN) 30 Units in sodium chloride 0.9 % 500 mL infusion  166 gabe-units/min Intravenous PRN Ingrid Stanford, DO        methylergonovine (METHERGINE) injection 200 mcg  200 mcg Intramuscular PRN Ingrid Stanford, DO        carboprost (HEMABATE) injection 250 mcg  250 mcg Intramuscular PRN Ingrid Stanford, DO        miSOPROStol (CYTOTEC) tablet 900 mcg  900 mcg Rectal PRN Ingrid Stanford, DO        acetaminophen (TYLENOL) tablet 650 mg  650 mg Oral Q4H PRN Ingrid Stanford, DO        witch hazel-glycerin (TUCKS) pad   Topical PRN Ingrid Stanford, DO        benzocaine-menthol (DERMOPLAST) 20-0.5 % spray   Topical PRN Ingrid Stanford,            Allergies:  No Known Allergies    Problem List:    Patient Active Problem List   Diagnosis Code    Term pregnancy Z34.90       Past Medical History:        Diagnosis Date    Endometriosis        Past Surgical History:        Procedure Laterality Date    OVARIAN CYST SURGERY Left 2015    TONSILLECTOMY      WISDOM TOOTH EXTRACTION         Social History:    Social History     Tobacco Use    Smoking status: Never Smoker    Smokeless tobacco: Never Used   Substance Use Topics    Alcohol use: Not Currently                                Counseling given: Not Answered      Vital Signs (Current):   Vitals:    05/04/21 1125 05/04/21 1200 05/04/21 1221 05/04/21 1321   BP: 114/63 112/77 131/61 107/68   Pulse: 60 76 63 73   Resp: 16 16 16 16   Temp:       TempSrc:       Weight:       Height:                                                  BP Readings from Last 3 Encounters:   05/04/21 107/68   05/03/21 (!) 115/47   05/03/21 112/68       NPO Status: Time of last liquid consumption: 1800                        Time of last solid consumption: 1645                        Date of last liquid consumption: 05/03/21                        Date of last solid food consumption: 05/03/21    BMI:   Wt Readings from Last 3 Encounters:   05/03/21 176 lb (79.8 kg)   05/03/21 176 lb (79.8 kg)   05/03/21 178 lb 12.8 oz (81.1 kg)     Body mass index is 30.21 kg/m². CBC:   Lab Results   Component Value Date    WBC 8.6 05/03/2021    RBC 3.67 05/03/2021    HGB 11.9 05/03/2021    HCT 34.4 05/03/2021    MCV 93.7 05/03/2021    RDW 12.6 05/03/2021     05/03/2021       CMP:   Lab Results   Component Value Date    GLUCOSE 119 02/04/2021       POC Tests: No results for input(s): POCGLU, POCNA, POCK, POCCL, POCBUN, POCHEMO, POCHCT in the last 72 hours. Coags: No results found for: PROTIME, INR, APTT    HCG (If Applicable): No results found for: PREGTESTUR, PREGSERUM, HCG, HCGQUANT     ABGs: No results found for: PHART, PO2ART, BRW0VUF, HOT2CHT, BEART, N4NDWRGG     Type & Screen (If Applicable):  No results found for: LABABO, LABRH    Drug/Infectious Status (If Applicable):  Lab Results   Component Value Date    HEPCAB NONREACTIVE 10/15/2020       COVID-19 Screening (If Applicable): No results found for: COVID19        Anesthesia Evaluation  Patient summary reviewed and Nursing notes reviewed no history of anesthetic complications:   Airway: Mallampati: I  TM distance: >3 FB   Neck ROM: full  Mouth opening: > = 3 FB Dental: normal exam         Pulmonary:Negative Pulmonary ROS and normal exam                               Cardiovascular:Negative CV ROS  Exercise tolerance: good (>4 METS),            Beta Blocker:  Not on Beta Blocker         Neuro/Psych:   Negative Neuro/Psych ROS              GI/Hepatic/Renal: Neg GI/Hepatic/Renal ROS       (-) GERD       Endo/Other: Negative Endo/Other ROS                    Abdominal:           Vascular: negative vascular ROS. Anesthesia Plan      epidural     ASA 2             Anesthetic plan and risks discussed with patient. Plan discussed with CRNA.                   JOO Monge - CORAL   5/4/2021

## 2021-05-05 PROCEDURE — 6370000000 HC RX 637 (ALT 250 FOR IP): Performed by: OBSTETRICS & GYNECOLOGY

## 2021-05-05 PROCEDURE — 1220000000 HC SEMI PRIVATE OB R&B

## 2021-05-05 RX ORDER — IBUPROFEN 800 MG/1
800 TABLET ORAL EVERY 8 HOURS
Qty: 120 TABLET | Refills: 3 | Status: SHIPPED | OUTPATIENT
Start: 2021-05-05

## 2021-05-05 RX ADMIN — IBUPROFEN 800 MG: 800 TABLET, FILM COATED ORAL at 20:03

## 2021-05-05 RX ADMIN — IBUPROFEN 800 MG: 800 TABLET, FILM COATED ORAL at 12:11

## 2021-05-05 ASSESSMENT — PAIN SCALES - GENERAL
PAINLEVEL_OUTOF10: 2
PAINLEVEL_OUTOF10: 2

## 2021-05-05 NOTE — PLAN OF CARE
Problem: VAGINAL DELIVERY - RECOVERY AND POST PARTUM  Goal: Vital signs are medically acceptable  5/5/2021 0855 by Rhett Soni RN  Outcome: Ongoing  5/5/2021 0740 by Kelly Elizabeth RN  Outcome: Met This Shift  Goal: Patient will remain free of falls  5/5/2021 0855 by Rhett Soni RN  Outcome: Ongoing  5/5/2021 0740 by Kelly Elizabeth RN  Outcome: Met This Shift  Goal: Fundus firm at midline  5/5/2021 0855 by Rhett Soni RN  Outcome: Ongoing  5/5/2021 0740 by Kelly Elizabeth RN  Outcome: Met This Shift  Goal: Moderate rubra without clots, no purulent discharge, no foul smelling lochia  5/5/2021 0855 by Rhett Soni RN  Outcome: Ongoing  5/5/2021 0740 by Kelly Elizabeth RN  Outcome: Met This Shift  Goal: Empties bladder  5/5/2021 0855 by Rhett Soni RN  Outcome: Ongoing  5/5/2021 0740 by Kelly Elizabeth RN  Outcome: Met This Shift  Goal: Verbalizes understanding of normal bowel function resumption  5/5/2021 0855 by Rhett Soni RN  Outcome: Ongoing  5/5/2021 0740 by Kelly Elizabeth RN  Outcome: Met This Shift  Goal: Edema will be absent or minimal  5/5/2021 0855 by Rhett Soni RN  Outcome: Ongoing  5/5/2021 0740 by Kelly Elizabeth RN  Outcome: Met This Shift  Goal: Breasts are soft with nipple integrity intact  5/5/2021 0855 by Rhett Soni RN  Outcome: Ongoing  5/5/2021 0740 by Kelly Elizabeth RN  Outcome: Met This Shift  Goal: Demonstrates appropriate breast feeding techniques  5/5/2021 0855 by Rhett Soni RN  Outcome: Ongoing  5/5/2021 0740 by Kelly Elizabeth RN  Outcome: Met This Shift  Goal: Appropriate behavior observed  5/5/2021 0855 by Rhett Soni RN  Outcome: Ongoing  5/5/2021 0740 by Kelly Elizabeth RN  Outcome: Met This Shift  Goal: Positive Mother-Baby interactions are observed  5/5/2021 0855 by Rhett Soni RN  Outcome: Ongoing  5/5/20212021 0740 by Kelly Elizabeth RN  Outcome: Met This Shift  Goal: Perineum intact without discharge or hematoma  5/5/2021 0855 by Rhett Soni RN  Outcome: Ongoing  5/5/2021 0740 by Kelly Elizabeth RN  Outcome: Met This Shift  Goal: Ambulates independently  5/5/2021 0855 by Rhett Soni RN  Outcome: Ongoing  5/5/2021 0740 by Kelly Elizabeth RN  Outcome: Met This Shift     Problem: PAIN  Goal: Patient's pain/discomfort is manageable  5/5/2021 0855 by Rhett Soni RN  Outcome: Ongoing  5/5/2021 0740 by Kelly Elizabeth RN  Outcome: Met This Shift     Problem: KNOWLEDGE DEFICIT  Goal: Patient/S.O. demonstrates understanding of disease process, treatment plan, medications, and discharge instructions.   5/5/2021 0855 by Rhett Soni RN  Outcome: Ongoing  5/5/2021 0740 by Kelly Elizabeth RN  Outcome: Met This Shift

## 2021-05-05 NOTE — DISCHARGE SUMMARY
Obstetrical Discharge Form        Gestational Age:40w4d    Antepartum complications: none    Date of Delivery: 21    Type of Delivery:        Delivered By:  Dr. Dykes Him By:N/A}      Baby: female    Anesthesia:  epidural      Intrapartum complications: None    Feeding method: breast    Blood type: O POSITIVE      Rubella:    Rubella Antibody, IGG   Date Value Ref Range Status   10/15/2020 86.3 IU/mL Final     Comment:                 REFERENCE RANGE:  <5.0       NON-REACTIVE (non-immune)  5.0 TO 9.9 EQUIVOCAL  >=10.0     REACTIVE     (immune)             T. Pallidium, IGG:    T. pallidum, IgG   Date Value Ref Range Status   10/15/2020 NONREACTIVE NONREACTIVE Final     Comment:           T. pallidum antibodies are not detected. There is no serological evidence of infection with T. pallidum (early primary syphilis   cannot be excluded). Retest in 2-4 weeks if syphilis is clinically suspect.              Hepatitis B Surface Antigen:   Hepatitis B Surface Ag   Date Value Ref Range Status   10/15/2020 NONREACTIVE NONREACTIVE Final     HIV:  No results found for: [id number]    Results for orders placed or performed during the hospital encounter of 21   DRUG SCREEN MULTI URINE   Result Value Ref Range    Amphetamine Screen, Ur NEGATIVE NEGATIVE    Barbiturate Screen, Ur NEGATIVE NEGATIVE    Benzodiazepine Screen, Urine NEGATIVE NEGATIVE    Cocaine Metabolite, Urine NEGATIVE NEGATIVE    Methadone Screen, Urine NEGATIVE NEGATIVE    Opiates, Urine NEGATIVE NEGATIVE    Phencyclidine, Urine NEGATIVE NEGATIVE    Propoxyphene, Urine NEGATIVE NEGATIVE    Cannabinoid Scrn, Ur NEGATIVE NEGATIVE    Oxycodone Screen, Ur NEGATIVE NEGATIVE    Methamphetamine, Urine NEGATIVE NEGATIVE    Tricyclic Antidepressants, Urine NEGATIVE NEGATIVE    MDMA, Urine NOT REPORTED NEGATIVE    Buprenorphine Urine NEGATIVE NEGATIVE    Test Information NOT REPORTED    CBC auto differential   Result Value Ref Range    WBC 8.6 3.5 - 11.3 k/uL    RBC 3.67 (L) 3.95 - 5.11 m/uL    Hemoglobin 11.9 11.9 - 15.1 g/dL    Hematocrit 34.4 (L) 36.3 - 47.1 %    MCV 93.7 82.6 - 102.9 fL    MCH 32.4 25.2 - 33.5 pg    MCHC 34.6 28.4 - 34.8 g/dL    RDW 12.6 11.8 - 14.4 %    Platelets 064 796 - 646 k/uL    MPV 9.8 8.1 - 13.5 fL    NRBC Automated 0.0 0.0 per 100 WBC    Differential Type NOT REPORTED     Seg Neutrophils 76 (H) 36 - 65 %    Lymphocytes 17 (L) 24 - 43 %    Monocytes 6 3 - 12 %    Eosinophils % 0 (L) 1 - 4 %    Basophils 0 0 - 2 %    Immature Granulocytes 1 (H) 0 %    Segs Absolute 6.43 1.50 - 8.10 k/uL    Absolute Lymph # 1.50 1.10 - 3.70 k/uL    Absolute Mono # 0.54 0.10 - 1.20 k/uL    Absolute Eos # 0.03 0.00 - 0.44 k/uL    Basophils Absolute <0.03 0.00 - 0.20 k/uL    Absolute Immature Granulocyte 0.09 0.00 - 0.30 k/uL    WBC Morphology NOT REPORTED     RBC Morphology NOT REPORTED     Platelet Estimate NOT REPORTED          Postpartum complications: none    Discharge Medication:    Chris Valdez   [place] Medication Instructions PWT:[id number]    Printed on:05/05/21 0490   Medication Information                      Azelaic Acid 15 % GEL  Apply topically             FISH OIL-KRILL OIL PO  Take by mouth             ibuprofen (ADVIL;MOTRIN) 800 MG tablet  Take 1 tablet by mouth every 8 hours             Prenatal Vit-Fe Fumarate-FA (PRENATAL VITAMINS PO)  Take by mouth             Probiotic Product (PROBIOTIC DAILY PO)  Take by mouth                    Admit date: 5/3/2021  5:11 PM    Discharge Date: 5/5/2021    Discharged to: Home in stable condition        Plan:   Follow up in 6 weeks with Dr. Edvin Bedolla

## 2021-05-05 NOTE — FLOWSHEET NOTE
Discussed with pt making followup appts for her and the baby.  Also asked pt if she had the TDAP vaccine she said she had it during her pregnancy

## 2021-05-05 NOTE — LACTATION NOTE
Lactation education:    [x] Latch/ good latch vs shallow latch/ steps to obtaining deep latch    [x] How to know if infant is eating enough/ feedings per 24 hours, wet/dirty diapers    [x] Feeding/satiety cues      Lactation education resources given:     [x]  How to Breastfeed your baby - [address] publication      [x]  Information on feeding cues     [x]  Support group handout    [x]  Breastpump cleaning guidelines - [facility]     [x]  Breastfeeding & Safe Sleep handout - [address] publication    [x]  Calling All Dads! Handout - [address] publication    []  Breast and Nipple Care - [facility]     []  [address]    []  [place]    []  Going Back to Work - [facility]    []  Preventing Engorgement - [facility]    Supplies given:    [x]  Brush, soap and basin for breastpump cleaning    []  Insurance pump provided     [x]  [facility] pump set up for patient to use    Explained to patient, patient verbalizes understanding.         Signed:  Maria Guadalupe Parker RN, BSN, IBCLC

## 2021-05-05 NOTE — LACTATION NOTE
This note was copied from a baby's chart. Feeding plan for sore nipples and shallow latch: Mom will latch infant at each feeding and limit latching per her comfort, 5-15 minutes. Mom will pump with double electric breast pump after each feeding. Infant will be re-fed expressed breast milk after latching. Frequency: at least every 3 hours. Infant should be feeding at least 8 times in 24 hours (about every 3 hours). Mom should pump or hand express after every feeding, using breast massage before and during pumping/feeding. Give the following amounts after or in place of breastfeeding, using expressed milk and/or formula:   0-12 hours:    up to 5 ml every 3 hours   12-24 hours:   5-10 ml every 3 hours   24-48 hours:   5-15 ml every 3 hours   48-72 hours:   15-30 ml every 3 hours   72-96 hours:   30-45 ml every 3 hours   Over 96 hours:  45-60 ml every 3 hours    If baby cues for more food, repeat the feeding until satisfied (breastfeed if infant is able). Continue to pump and feed expressed breastmilk every feeding until baby is nursing well, has enough wet and dirty diapers, and you do not need to use any formula.   Schedule a follow up appointment if not making progress

## 2021-05-06 VITALS
OXYGEN SATURATION: 100 % | DIASTOLIC BLOOD PRESSURE: 71 MMHG | RESPIRATION RATE: 16 BRPM | TEMPERATURE: 97.6 F | WEIGHT: 176 LBS | SYSTOLIC BLOOD PRESSURE: 130 MMHG | HEIGHT: 64 IN | BODY MASS INDEX: 30.05 KG/M2 | HEART RATE: 71 BPM

## 2021-05-06 PROCEDURE — 99024 POSTOP FOLLOW-UP VISIT: CPT | Performed by: ADVANCED PRACTICE MIDWIFE

## 2021-05-06 PROCEDURE — 6370000000 HC RX 637 (ALT 250 FOR IP): Performed by: OBSTETRICS & GYNECOLOGY

## 2021-05-06 RX ADMIN — IBUPROFEN 800 MG: 800 TABLET, FILM COATED ORAL at 04:24

## 2021-05-06 RX ADMIN — Medication 40 EACH: at 04:24

## 2021-05-06 NOTE — PLAN OF CARE
Problem: VAGINAL DELIVERY - RECOVERY AND POST PARTUM  Goal: Vital signs are medically acceptable  5/5/2021 2101 by Eber Perez RN  Outcome: Met This Shift  5/5/2021 0855 by Alexus Torres RN  Outcome: Ongoing  5/5/2021 0740 by Eber Perez RN  Outcome: Met This Shift  Goal: Patient will remain free of falls  5/5/2021 2101 by Eber Perez RN  Outcome: Met This Shift  5/5/2021 0855 by Alexus Torres RN  Outcome: Ongoing  5/5/2021 0740 by Eber Perez RN  Outcome: Met This Shift  Goal: Fundus firm at midline  5/5/2021 2101 by Eber Perez RN  Outcome: Met This Shift  5/5/2021 0855 by Alexus Torres RN  Outcome: Ongoing  5/5/2021 0740 by Eber Perez RN  Outcome: Met This Shift  Goal: Moderate rubra without clots, no purulent discharge, no foul smelling lochia  5/5/2021 2101 by Eber Perez RN  Outcome: Met This Shift  5/5/2021 0855 by Alexus Torres RN  Outcome: Ongoing  5/5/2021 0740 by Eber Perez RN  Outcome: Met This Shift  Goal: Empties bladder  5/5/2021 2101 by Eber Perez RN  Outcome: Met This Shift  5/5/2021 0855 by Alexus Torres RN  Outcome: Ongoing  5/5/2021 0740 by Eber Perez RN  Outcome: Met This Shift  Goal: Verbalizes understanding of normal bowel function resumption  5/5/2021 2101 by Eber Perez RN  Outcome: Met This Shift  5/5/2021 0855 by Alexus Trores RN  Outcome: Ongoing  5/5/2021 0740 by Eber Perez RN  Outcome: Met This Shift  Goal: Edema will be absent or minimal  5/5/2021 2101 by Eber Perez RN  Outcome: Met This Shift  5/5/2021 0855 by Alexus Torres RN  Outcome: Ongoing  5/5/2021 0740 by Eber Perez RN  Outcome: Met This Shift  Goal: Breasts are soft with nipple integrity intact  5/5/2021 2101 by Eber Perez, RN  Outcome: Met This Shift  5/5/2021 0855 by Alexus Torres RN  Outcome: Ongoing  5/5/2021 0740 by Eber Perez, RN  Outcome: Met This Shift  Goal: Demonstrates appropriate breast feeding techniques  5/5/2021 2101 by Radha Miles RN  Outcome: Met This Shift  5/5/2021 0855 by Stephanie Sepulveda RN  Outcome: Ongoing  5/5/2021 0740 by Radha Miles RN  Outcome: Met This Shift  Goal: Appropriate behavior observed  5/5/2021 2101 by Radha Miles RN  Outcome: Met This Shift  5/5/2021 0855 by Stephanie Sepulveda RN  Outcome: Ongoing  5/5/2021 0740 by Radha Miles RN  Outcome: Met This Shift  Goal: Positive Mother-Baby interactions are observed  5/5/2021 2101 by Radha Miles RN  Outcome: Met This Shift  5/5/2021 0855 by Stephanie Sepulveda RN  Outcome: Ongoing  5/5/2021 0740 by Radha Miles RN  Outcome: Met This Shift  Goal: Perineum intact without discharge or hematoma  5/5/2021 2101 by Radha Miles RN  Outcome: Met This Shift  5/5/2021 0855 by Stephanie Sepulveda RN  Outcome: Ongoing  5/5/2021 0740 by Radha Miles RN  Outcome: Met This Shift  Goal: Ambulates independently  5/5/2021 2101 by Radha Miles RN  Outcome: Met This Shift  5/5/2021 0855 by Stephanie Sepulveda RN  Outcome: Ongoing  5/5/2021 0740 by Radha Miles RN  Outcome: Met This Shift     Problem: PAIN  Goal: Patient's pain/discomfort is manageable  5/5/2021 2101 by Radha Miles RN  Outcome: Met This Shift  5/5/2021 0855 by Stephanie Sepulveda RN  Outcome: Ongoing  5/5/2021 0740 by Radha Miles RN  Outcome: Met This Shift     Problem: KNOWLEDGE DEFICIT  Goal: Patient/S.O. demonstrates understanding of disease process, treatment plan, medications, and discharge instructions.   5/5/2021 2101 by Radha Miles RN  Outcome: Met This Shift  5/5/2021 0855 by Stephanie Sepulveda RN  Outcome: Ongoing  5/5/2021 0740 by Radha Miles RN  Outcome: Met This Shift

## 2021-05-06 NOTE — DISCHARGE SUMMARY
Obstetrical Discharge Form        Gestational Age:40w4d    Antepartum complications: Postdates nonreassuring fetal status    Date of Delivery: 21    Type of Delivery:         Delivered By:  Dr. Keith Spangler By:N/A}      Baby: female    Anesthesia:  epidural      Intrapartum complications: None    Feeding method: breast    Blood type: O POSITIVE      Rubella:    Rubella Antibody, IGG   Date Value Ref Range Status   10/15/2020 86.3 IU/mL Final     Comment:                 REFERENCE RANGE:  <5.0       NON-REACTIVE (non-immune)  5.0 TO 9.9 EQUIVOCAL  >=10.0     REACTIVE     (immune)             T. Pallidium, IGG:    T. pallidum, IgG   Date Value Ref Range Status   10/15/2020 NONREACTIVE NONREACTIVE Final     Comment:           T. pallidum antibodies are not detected. There is no serological evidence of infection with T. pallidum (early primary syphilis   cannot be excluded). Retest in 2-4 weeks if syphilis is clinically suspect.              Hepatitis B Surface Antigen:   Hepatitis B Surface Ag   Date Value Ref Range Status   10/15/2020 NONREACTIVE NONREACTIVE Final     HIV:  No results found for: [id number]    Results for orders placed or performed during the hospital encounter of 21   DRUG SCREEN MULTI URINE   Result Value Ref Range    Amphetamine Screen, Ur NEGATIVE NEGATIVE    Barbiturate Screen, Ur NEGATIVE NEGATIVE    Benzodiazepine Screen, Urine NEGATIVE NEGATIVE    Cocaine Metabolite, Urine NEGATIVE NEGATIVE    Methadone Screen, Urine NEGATIVE NEGATIVE    Opiates, Urine NEGATIVE NEGATIVE    Phencyclidine, Urine NEGATIVE NEGATIVE    Propoxyphene, Urine NEGATIVE NEGATIVE    Cannabinoid Scrn, Ur NEGATIVE NEGATIVE    Oxycodone Screen, Ur NEGATIVE NEGATIVE    Methamphetamine, Urine NEGATIVE NEGATIVE    Tricyclic Antidepressants, Urine NEGATIVE NEGATIVE    MDMA, Urine NOT REPORTED NEGATIVE    Buprenorphine Urine NEGATIVE NEGATIVE    Test Information NOT REPORTED    CBC auto differential   Result Value Ref Range    WBC 8.6 3.5 - 11.3 k/uL    RBC 3.67 (L) 3.95 - 5.11 m/uL    Hemoglobin 11.9 11.9 - 15.1 g/dL    Hematocrit 34.4 (L) 36.3 - 47.1 %    MCV 93.7 82.6 - 102.9 fL    MCH 32.4 25.2 - 33.5 pg    MCHC 34.6 28.4 - 34.8 g/dL    RDW 12.6 11.8 - 14.4 %    Platelets 067 775 - 170 k/uL    MPV 9.8 8.1 - 13.5 fL    NRBC Automated 0.0 0.0 per 100 WBC    Differential Type NOT REPORTED     Seg Neutrophils 76 (H) 36 - 65 %    Lymphocytes 17 (L) 24 - 43 %    Monocytes 6 3 - 12 %    Eosinophils % 0 (L) 1 - 4 %    Basophils 0 0 - 2 %    Immature Granulocytes 1 (H) 0 %    Segs Absolute 6.43 1.50 - 8.10 k/uL    Absolute Lymph # 1.50 1.10 - 3.70 k/uL    Absolute Mono # 0.54 0.10 - 1.20 k/uL    Absolute Eos # 0.03 0.00 - 0.44 k/uL    Basophils Absolute <0.03 0.00 - 0.20 k/uL    Absolute Immature Granulocyte 0.09 0.00 - 0.30 k/uL    WBC Morphology NOT REPORTED     RBC Morphology NOT REPORTED     Platelet Estimate NOT REPORTED          Postpartum complications: none    Discharge Medication:    Brain Matt   Home Medication Instructions [id number]    Printed on:05/06/21 0058   Medication Information                      Azelaic Acid 15 % GEL  Apply topically             FISH OIL-KRILL OIL PO  Take by mouth             ibuprofen (ADVIL;MOTRIN) 800 MG tablet  Take 1 tablet by mouth every 8 hours             Prenatal Vit-Fe Fumarate-FA (PRENATAL VITAMINS PO)  Take by mouth             Probiotic Product (PROBIOTIC DAILY PO)  Take by mouth                    Admit date: 5/3/2021  5:11 PM    Discharge Date: 5/6/2021    Discharged to: Home in stable condition        Plan:   Follow up call office for follow up

## 2021-05-06 NOTE — PLAN OF CARE
Perineum intact without discharge or hematoma  5/6/2021 0828 by Memo Cr RN  Outcome: Ongoing  5/5/2021 2101 by Shobha Braun RN  Outcome: Met This Shift  Goal: Ambulates independently  5/6/2021 0828 by Memo Cr RN  Outcome: Ongoing  5/5/2021 2101 by Shobha Braun RN  Outcome: Met This Shift     Problem: PAIN  Goal: Patient's pain/discomfort is manageable  5/6/2021 0828 by Memo Cr RN  Outcome: Ongoing  5/5/2021 2101 by Shobha Braun RN  Outcome: Met This Shift     Problem: KNOWLEDGE DEFICIT  Goal: Patient/S.O. demonstrates understanding of disease process, treatment plan, medications, and discharge instructions.   5/6/2021 0828 by Memo Cr RN  Outcome: Ongoing  5/5/2021 2101 by Shobha Braun RN  Outcome: Met This Shift

## 2021-05-06 NOTE — FLOWSHEET NOTE
Patient off unit in stable condition. Departure Mode: with significant other.     Mobility at Departure: ambulatory    Discharged to: private residence    Time of Discharge: 1120

## 2021-05-07 NOTE — ANESTHESIA POSTPROCEDURE EVALUATION
Department of Anesthesiology  Postprocedure Note    Patient: Edgar Henley  MRN: [id number]  YOB: 1990  Date of evaluation: 5/7/2021  Time:  8:39 AM     Procedure Summary     Date: 05/04/21 Room / Location:     Anesthesia Start: 8493 Anesthesia Stop: 1800    Procedure: Labor Analgesia Diagnosis:     Scheduled Providers:  Responsible Provider: JOO Ramirez CRNA    Anesthesia Type: epidural ASA Status: 2          Anesthesia Type: epidural    Hola Phase I:      Hola Phase II:      Last vitals: Reviewed and per EMR flowsheets.        Anesthesia Post Evaluation    Patient location during evaluation: bedside  Patient participation: complete - patient participated  Level of consciousness: awake and alert  Airway patency: patent  Nausea & Vomiting: no nausea and no vomiting  Complications: no  Cardiovascular status: hemodynamically stable  Respiratory status: acceptable and room air  Hydration status: stable

## 2021-06-21 ENCOUNTER — POSTPARTUM VISIT (OUTPATIENT)
Dept: OBGYN CLINIC | Age: 31
End: 2021-06-21

## 2021-06-21 VITALS
DIASTOLIC BLOOD PRESSURE: 71 MMHG | SYSTOLIC BLOOD PRESSURE: 110 MMHG | BODY MASS INDEX: 25.61 KG/M2 | HEIGHT: 64 IN | WEIGHT: 150 LBS

## 2021-06-21 PROCEDURE — 0503F POSTPARTUM CARE VISIT: CPT | Performed by: ADVANCED PRACTICE MIDWIFE

## 2021-06-21 NOTE — PROGRESS NOTES
POSTPARTUM EXAM    Date of service: 2021    Meli Otero  Is a 32 y.o.  female    PT's PCP is: Avril Russell     : 1990     OB History    Para Term  AB Living   1 1 1     1   SAB TAB Ectopic Molar Multiple Live Births           0 1      # Outcome Date GA Lbr Daryl/2nd Weight Sex Delivery Anes PTL Lv   1 Term 21 40w4d / 00:25 7 lb 2.1 oz (3.235 kg) F Vag-Spont EPI N INDIRA        Social History     Tobacco Use   Smoking Status Never Smoker   Smokeless Tobacco Never Used         Social History     Substance and Sexual Activity   Alcohol Use Not Currently         Delivery date 21    Type of delivery:  Spontaneous vaginal delivery    Laceration:Yes, Second degree laceration. Infant gender: female    Are you breast or bottle feeding? Breast    Have you been sexually active since delivery? No    Vital Signs: Blood pressure 110/71, height 5' 4\" (1.626 m), weight 150 lb (68 kg), last menstrual period 2020, unknown if currently breastfeeding. Labs:    Blood Type and Rh: O POSITIVE          Allergies: Patient has no known allergies. Current Outpatient Medications:     ibuprofen (ADVIL;MOTRIN) 800 MG tablet, Take 1 tablet by mouth every 8 hours, Disp: 120 tablet, Rfl: 3    Azelaic Acid 15 % GEL, Apply topically, Disp: , Rfl:     Prenatal Vit-Fe Fumarate-FA (PRENATAL VITAMINS PO), Take by mouth, Disp: , Rfl:     FISH OIL-KRILL OIL PO, Take by mouth, Disp: , Rfl:     Probiotic Product (PROBIOTIC DAILY PO), Take by mouth, Disp: , Rfl:     Chief Complaint   Patient presents with    Postpartum Care     PPV. 2nd degree tear. Pt is breastfeeding. Pt stopped bleeding 5 weeks PP. Pt thinks has cycle around . Pt has not resumed intercourse.         How many Hours of sleep do you get a night: Wakes every 5-6 hours for feeding    Do you have a normal appetite: Yes    Any problems or pain: \"just normal changes I think\" - anxiety about caring for a new infant and adjusting. Has oversupply of breastmilk - will feed infant then pump 8oz, infant has had tongue tie correction    Do you feel like you coping well: \"I think so\"    Is baby sleeping:Yes    How is baby eating: Well    How did first pediatrician visit go: Good - adequate weight gain, voids and stools    EPPDS:10    No results found for this visit on 06/21/21. HPI:  PT presents for Post partum exam Follow up prn after delivery. Lochia has ceased, menses and intercourse have not resumed. Did have recent bleeding - possibly last bit of eschar lochia. Breastfeeding with oversupply    Physical Exam  Constitutional:       Appearance: Normal appearance. She is normal weight. Genitourinary:      Pelvic exam was performed with patient in the lithotomy position. Vulva normal.      Genitourinary Comments: Perineum well healed   HENT:      Head: Normocephalic. Eyes:      Pupils: Pupils are equal, round, and reactive to light. Pulmonary:      Effort: Pulmonary effort is normal.   Musculoskeletal:         General: Normal range of motion. Cervical back: Normal range of motion. Neurological:      Mental Status: She is alert and oriented to person, place, and time. Skin:     General: Skin is warm and dry. Psychiatric:         Mood and Affect: Mood normal.         Behavior: Behavior normal.   Vitals and nursing note reviewed. Assessment and Plan          Diagnosis Orders   1. Postpartum care and examination         Discussed when menses may resume  Discussed oversupply of breast milk and how to decrease supply slowly   Discussed counseling as patient interested to \"just talk through things\". Denies SI/HI. Discussed scheduling annual and return to normal activities      I am having Jena Herzog maintain her Prenatal Vit-Fe Fumarate-FA (PRENATAL VITAMINS PO), FISH OIL-KRILL OIL PO, Probiotic Product (PROBIOTIC DAILY PO), Azelaic Acid, and ibuprofen.     Return in about 6 months (around 12/21/2021) for Yearly. She was also counseled on her preventative health maintenance recommendations and follow-up. There are no Patient Instructions on file for this visit.     JOO Duncan CNM,6/21/2021 3:38 PM

## 2022-01-10 ENCOUNTER — OFFICE VISIT (OUTPATIENT)
Dept: OBGYN CLINIC | Age: 32
End: 2022-01-10
Payer: COMMERCIAL

## 2022-01-10 VITALS — WEIGHT: 148 LBS | DIASTOLIC BLOOD PRESSURE: 70 MMHG | BODY MASS INDEX: 25.4 KG/M2 | SYSTOLIC BLOOD PRESSURE: 114 MMHG

## 2022-01-10 DIAGNOSIS — Z12.72 SMEAR, VAGINAL, AS PART OF ROUTINE GYNECOLOGICAL EXAMINATION: Primary | ICD-10-CM

## 2022-01-10 DIAGNOSIS — Z01.419 SMEAR, VAGINAL, AS PART OF ROUTINE GYNECOLOGICAL EXAMINATION: Primary | ICD-10-CM

## 2022-01-10 PROCEDURE — 99395 PREV VISIT EST AGE 18-39: CPT | Performed by: ADVANCED PRACTICE MIDWIFE

## 2022-01-10 ASSESSMENT — ENCOUNTER SYMPTOMS
DIARRHEA: 0
ABDOMINAL PAIN: 0
GASTROINTESTINAL NEGATIVE: 1
RESPIRATORY NEGATIVE: 1
SHORTNESS OF BREATH: 0
CONSTIPATION: 0

## 2022-01-10 NOTE — PROGRESS NOTES
YEARLY PHYSICAL    Date of service: 1/10/2022    Charbel Baugh  Is a 32 y.o.   female    PT's PCP is: Cosme Alejandra     : 1990                                         Chaperone for Intimate Exam   Chaperone was offered as part of the rooming process. Patient declined and agrees to continue with exam without a chaperone.  Chaperone: n/a      Subjective:       Patient's last menstrual period was 2021. Are your menses regular: yes    OB History    Para Term  AB Living   1 1 1     1   SAB IAB Ectopic Molar Multiple Live Births           0 1      # Outcome Date GA Lbr Daryl/2nd Weight Sex Delivery Anes PTL Lv   1 Term 21 40w4d / 00:25 7 lb 2.1 oz (3.235 kg) F Vag-Spont EPI N INDIRA        Social History     Tobacco Use   Smoking Status Never Smoker   Smokeless Tobacco Never Used        Social History     Substance and Sexual Activity   Alcohol Use Not Currently       Family History   Problem Relation Age of Onset    No Known Problems Paternal Grandfather     No Known Problems Paternal Grandmother     No Known Problems Maternal Grandmother     No Known Problems Maternal Grandfather     Hypertension Father     No Known Problems Mother     No Known Problems Sister     No Known Problems Sister        Any family history of breast or ovarian cancer: No    Any family history of blood clots: No      Allergies: Patient has no known allergies.       Current Outpatient Medications:     ibuprofen (ADVIL;MOTRIN) 800 MG tablet, Take 1 tablet by mouth every 8 hours, Disp: 120 tablet, Rfl: 3    Azelaic Acid 15 % GEL, Apply topically, Disp: , Rfl:     Prenatal Vit-Fe Fumarate-FA (PRENATAL VITAMINS PO), Take by mouth, Disp: , Rfl:     FISH OIL-KRILL OIL PO, Take by mouth, Disp: , Rfl:     Probiotic Product (PROBIOTIC DAILY PO), Take by mouth, Disp: , Rfl:     Social History     Substance and Sexual Activity Sexual Activity Yes    Partners: Male       Any bleeding or pain with intercourse: No    Last Yearly:  ? Last pap: 10/15/2020 normal    Last HPV: 10/2020 negative    Last Mammogram: NA    Last Dexascan NA    Last colorectal screen- type:NA  date      Do you do self breast exams: Yes    Past Medical History:   Diagnosis Date    Endometriosis        Past Surgical History:   Procedure Laterality Date    OVARIAN CYST SURGERY Left 2015    TONSILLECTOMY      WISDOM TOOTH EXTRACTION         Family History   Problem Relation Age of Onset    No Known Problems Paternal Grandfather     No Known Problems Paternal Grandmother     No Known Problems Maternal Grandmother     No Known Problems Maternal Grandfather     Hypertension Father     No Known Problems Mother     No Known Problems Sister     No Known Problems Sister        Chief Complaint   Patient presents with    Gynecologic Exam     Here for an annual appt, no concerns voiced          PE:  Vital Signs  Blood pressure 114/70, weight 148 lb (67.1 kg), last menstrual period 12/31/2021, unknown if currently breastfeeding. Estimated body mass index is 25.4 kg/m² as calculated from the following:    Height as of 6/21/21: 5' 4\" (1.626 m). Weight as of this encounter: 148 lb (67.1 kg). Labs:    No results found for this visit on 01/10/22. No data recorded    NURSE: MISAEL Holt    HPI: Annual.  Denies breast/pelvic concerns. Pap is UTD. Monogamous relationship. Menses regular. Review of Systems   Constitutional: Negative. Negative for chills, fatigue and fever. HENT: Negative. Respiratory: Negative. Negative for shortness of breath. Cardiovascular: Negative. Negative for chest pain. Gastrointestinal: Negative. Negative for abdominal pain, constipation and diarrhea. Genitourinary: Negative for dysuria, enuresis, frequency, menstrual problem, pelvic pain, urgency and vaginal bleeding. Musculoskeletal: Negative.     Neurological: Negative. Negative for dizziness, light-headedness and headaches. Psychiatric/Behavioral: Negative. Physical Exam  Constitutional:       Appearance: Normal appearance. She is normal weight. Genitourinary:      Vulva, bladder and urethral meatus normal.      No vaginal discharge or tenderness. No vaginal prolapse present. Right Adnexa: not tender. Left Adnexa: not tender. No cervical motion tenderness or discharge. Uterus is not tender. Pelvic exam was performed with patient in the lithotomy position. Breasts: Breasts are symmetrical.      Breasts are soft. Right: No mass, nipple discharge, skin change or tenderness. Left: No mass, nipple discharge, skin change or tenderness. HENT:      Head: Normocephalic. Eyes:      Pupils: Pupils are equal, round, and reactive to light. Cardiovascular:      Rate and Rhythm: Normal rate and regular rhythm. Pulses: Normal pulses. Heart sounds: Normal heart sounds. No murmur heard. Pulmonary:      Effort: Pulmonary effort is normal.      Breath sounds: Normal breath sounds. No wheezing. Abdominal:      Palpations: Abdomen is soft. Tenderness: There is no abdominal tenderness. Musculoskeletal:         General: Normal range of motion. Cervical back: Normal range of motion. No muscular tenderness. Neurological:      Mental Status: She is alert and oriented to person, place, and time. Skin:     General: Skin is warm and dry. Psychiatric:         Behavior: Behavior normal.   Vitals and nursing note reviewed. Chaperone present: Declined. Assessment and Plan          Diagnosis Orders   1. Smear, vaginal, as part of routine gynecological examination         Repeat Annual every 1 year  Cervical Cytology Evaluation begins at 24years old. If Negative Cytology, Follow-up screening per current guidelines. Mammograms every 1year.  If 37 yo and last mammogram was negative. Routine healthmaintenance per patients PCP. I am having Marika Purchase maintain her Prenatal Vit-Fe Fumarate-FA (PRENATAL VITAMINS PO), FISH OIL-KRILL OIL PO, Probiotic Product (PROBIOTIC DAILY PO), Azelaic Acid, and ibuprofen. Return in about 1 year (around 1/10/2023) for Yearly. She was also counseled on her preventative health maintenance recommendations and follow-up. There are no Patient Instructions on file for this visit.     JOO DRISCOLL - CASSI,1/10/2022 1:36 PM

## 2023-01-18 ENCOUNTER — TELEPHONE (OUTPATIENT)
Dept: OBGYN CLINIC | Age: 33
End: 2023-01-18

## 2023-01-18 NOTE — TELEPHONE ENCOUNTER
Pt calling stating cycle started earlier than usual and lighter than usual. Reports not on OCP and has had a headache, nausea and bloating. Denies pelvic pain, vaginal or urinary infection sx. Reports took negative home UPT. Advised it is not uncommon to have an irregular cycle intermittently- offered appt w/ provider to discuss or continue to monitor sx. Pt will continue to monitor sx for now- has annual appt on 2/2/23. Will call back if sx worsen.

## 2023-02-02 ENCOUNTER — OFFICE VISIT (OUTPATIENT)
Dept: OBGYN CLINIC | Age: 33
End: 2023-02-02
Payer: COMMERCIAL

## 2023-02-02 ENCOUNTER — HOSPITAL ENCOUNTER (OUTPATIENT)
Age: 33
Setting detail: SPECIMEN
Discharge: HOME | End: 2023-02-02

## 2023-02-02 VITALS
BODY MASS INDEX: 23.73 KG/M2 | DIASTOLIC BLOOD PRESSURE: 70 MMHG | SYSTOLIC BLOOD PRESSURE: 100 MMHG | HEIGHT: 64 IN | WEIGHT: 139 LBS

## 2023-02-02 DIAGNOSIS — Z13.29 SCREENING FOR THYROID DISORDER: ICD-10-CM

## 2023-02-02 DIAGNOSIS — Z87.42 HISTORY OF ENDOMETRIOSIS: ICD-10-CM

## 2023-02-02 DIAGNOSIS — N92.0 MENORRHAGIA WITH REGULAR CYCLE: ICD-10-CM

## 2023-02-02 DIAGNOSIS — Z12.72 SMEAR, VAGINAL, AS PART OF ROUTINE GYNECOLOGICAL EXAMINATION: Primary | ICD-10-CM

## 2023-02-02 DIAGNOSIS — Z12.4 SCREENING FOR CERVICAL CANCER: ICD-10-CM

## 2023-02-02 DIAGNOSIS — N94.6 DYSMENORRHEA: ICD-10-CM

## 2023-02-02 DIAGNOSIS — Z01.419 SMEAR, VAGINAL, AS PART OF ROUTINE GYNECOLOGICAL EXAMINATION: Primary | ICD-10-CM

## 2023-02-02 LAB
ABSOLUTE EOS #: 0.03 K/UL (ref 0–0.44)
ABSOLUTE IMMATURE GRANULOCYTE: <0.03 K/UL (ref 0–0.3)
ABSOLUTE LYMPH #: 1.98 K/UL (ref 1.1–3.7)
ABSOLUTE MONO #: 0.57 K/UL (ref 0.1–1.2)
BASOPHILS # BLD: 1 % (ref 0–2)
BASOPHILS ABSOLUTE: 0.07 K/UL (ref 0–0.2)
EOSINOPHILS RELATIVE PERCENT: 0 % (ref 1–4)
HCT VFR BLD AUTO: 40.4 % (ref 36.3–47.1)
HGB BLD-MCNC: 12.8 G/DL (ref 11.9–15.1)
IMMATURE GRANULOCYTES: 0 %
LYMPHOCYTES # BLD: 29 % (ref 24–43)
MCH RBC QN AUTO: 30.6 PG (ref 25.2–33.5)
MCHC RBC AUTO-ENTMCNC: 31.7 G/DL (ref 28.4–34.8)
MCV RBC AUTO: 96.7 FL (ref 82.6–102.9)
MONOCYTES # BLD: 8 % (ref 3–12)
NRBC AUTOMATED: 0 PER 100 WBC
PDW BLD-RTO: 12.2 % (ref 11.8–14.4)
PLATELET # BLD AUTO: 310 K/UL (ref 138–453)
PMV BLD AUTO: 10.1 FL (ref 8.1–13.5)
RBC # BLD: 4.18 M/UL (ref 3.95–5.11)
SEG NEUTROPHILS: 62 % (ref 36–65)
SEGMENTED NEUTROPHILS ABSOLUTE COUNT: 4.19 K/UL (ref 1.5–8.1)
TSH SERPL-ACNC: 0.98 UIU/ML (ref 0.3–5)
WBC # BLD AUTO: 6.9 K/UL (ref 3.5–11.3)

## 2023-02-02 PROCEDURE — 99395 PREV VISIT EST AGE 18-39: CPT | Performed by: ADVANCED PRACTICE MIDWIFE

## 2023-02-02 ASSESSMENT — ENCOUNTER SYMPTOMS
CONSTIPATION: 0
DIARRHEA: 0
RESPIRATORY NEGATIVE: 1
SHORTNESS OF BREATH: 0
ABDOMINAL PAIN: 0
GASTROINTESTINAL NEGATIVE: 1

## 2023-02-02 NOTE — PROGRESS NOTES
Chaperone for Intimate Exam  Chaperone was offered and accepted as part of the rooming process.   Chaperone: Tl Tirado

## 2023-02-02 NOTE — PROGRESS NOTES
YEARLY PHYSICAL    Date of service: 2023    Ralf Bennett  Is a 35 y.o.   female    PT's PCP is: Dago Nixon     : 1990                                             Subjective:       Patient's last menstrual period was 2023. Are your menses regular: yes but most recent was 6 days late    OB History    Para Term  AB Living   1 1 1     1   SAB IAB Ectopic Molar Multiple Live Births           0 1      # Outcome Date GA Lbr Daryl/2nd Weight Sex Delivery Anes PTL Lv   1 Term 21 40w4d / 00:25 7 lb 2.1 oz (3.235 kg) F Vag-Spont EPI N INDIRA        Social History     Tobacco Use   Smoking Status Never   Smokeless Tobacco Never        Social History     Substance and Sexual Activity   Alcohol Use Yes    Comment: occ       Family History   Problem Relation Age of Onset    No Known Problems Paternal Grandfather     No Known Problems Paternal Grandmother     No Known Problems Maternal Grandmother     No Known Problems Maternal Grandfather     Hypertension Father     No Known Problems Mother     No Known Problems Sister     No Known Problems Sister        Any family history of breast or ovarian cancer: No    Any family history of blood clots: No      Allergies: Patient has no known allergies.       Current Outpatient Medications:     ibuprofen (ADVIL;MOTRIN) 800 MG tablet, Take 1 tablet by mouth every 8 hours, Disp: 120 tablet, Rfl: 3    Azelaic Acid 15 % GEL, Apply topically, Disp: , Rfl:     Prenatal Vit-Fe Fumarate-FA (PRENATAL VITAMINS PO), Take by mouth, Disp: , Rfl:     FISH OIL-KRILL OIL PO, Take by mouth, Disp: , Rfl:     Probiotic Product (PROBIOTIC DAILY PO), Take by mouth, Disp: , Rfl:     Social History     Substance and Sexual Activity   Sexual Activity Yes    Partners: Male       Any bleeding or pain with intercourse: No    Last Yearly:       Last pap: Due    Last HPV: Due    Do you do self breast exams: Encouraged    Past Medical History:   Diagnosis Date    Endometriosis        Past Surgical History:   Procedure Laterality Date    OVARIAN CYST SURGERY Left 2015    TONSILLECTOMY      WISDOM TOOTH EXTRACTION         Family History   Problem Relation Age of Onset    No Known Problems Paternal Grandfather     No Known Problems Paternal Grandmother     No Known Problems Maternal Grandmother     No Known Problems Maternal Grandfather     Hypertension Father     No Known Problems Mother     No Known Problems Sister     No Known Problems Sister        Chief Complaint   Patient presents with    Annual Exam     Pap due. Pt has questions about cycles. States last cycle was late. Labs:    No results found for this visit on 02/02/23. HPI:  Annual.  Denies breast concerns. Denies pelvic pain aside from during cycle. Has hx of endometriosis - cycles are regular (aside from most recent), very very heavy, clots present, dysmenorrhea. Monogamous relationship. Due for pap smear. Review of Systems   Constitutional: Negative. Negative for chills, fatigue and fever. HENT: Negative. Respiratory: Negative. Negative for shortness of breath. Cardiovascular: Negative. Negative for chest pain. Gastrointestinal: Negative. Negative for abdominal pain, constipation and diarrhea. Genitourinary:  Positive for menstrual problem (heavy, regular) and pelvic pain (dysmenorrhea). Negative for dysuria, enuresis, frequency, urgency and vaginal bleeding. Musculoskeletal: Negative. Neurological: Negative. Negative for dizziness, light-headedness and headaches. Psychiatric/Behavioral: Negative. Objective  Blood pressure 100/70, height 5' 4\" (1.626 m), weight 139 lb (63 kg), last menstrual period 01/19/2023, not currently breastfeeding. Physical Exam  Constitutional:       Appearance: Normal appearance. She is normal weight.    Genitourinary:      Vulva, bladder and urethral meatus normal.      No vaginal discharge or tenderness. No vaginal prolapse present. Right Adnexa: not tender. Left Adnexa: not tender. No cervical motion tenderness or discharge. Uterus is not tender. Pelvic exam was performed with patient in the lithotomy position. Breasts:     Breasts are symmetrical.      Breasts are soft. Right: No mass, nipple discharge, skin change or tenderness. Left: No mass, nipple discharge, skin change or tenderness. HENT:      Head: Normocephalic. Eyes:      Pupils: Pupils are equal, round, and reactive to light. Cardiovascular:      Rate and Rhythm: Normal rate and regular rhythm. Pulses: Normal pulses. Heart sounds: Normal heart sounds. No murmur heard. Pulmonary:      Effort: Pulmonary effort is normal.      Breath sounds: Normal breath sounds. No wheezing. Abdominal:      Palpations: Abdomen is soft. Tenderness: There is no abdominal tenderness. Musculoskeletal:         General: Normal range of motion. Cervical back: Normal range of motion. No muscular tenderness. Neurological:      Mental Status: She is alert and oriented to person, place, and time. Skin:     General: Skin is warm and dry. Psychiatric:         Behavior: Behavior normal.   Vitals and nursing note reviewed. Chaperone present: [address] Student. Assessment and Plan          Diagnosis Orders   1. Smear, vaginal, as part of routine gynecological examination        2. Screening for cervical cancer  PAP SMEAR      3. Menorrhagia with regular cycle  CBC with Auto Differential      4. History of endometriosis        5. Screening for thyroid disorder  TSH with Reflex      6. Dysmenorrhea            Repeat Annual every 1 year  Cervical Cytology Evaluation begins at 24years old. If Negative Cytology, Follow-up screening per current guidelines. Mammograms every 1year. If 37 yo and last mammogram was negative.   Calcium and Vitamin D dosing reviewed. Birth control and barrier recommendationsdiscussed. STD counseling and prevention reviewed. Routine healthmaintenance per patients PCP. Discussed cycle control - to help with heavy bleeding and dysmenorrhea. Discussed hx of endo - she declines contraception to help with mgmt of this - would like to use diet and natural remedies - discussed motrin and EPO for mgmt. Planning to TTC sooner than not      I am having Nahid Mcgowan maintain her Prenatal Vit-Fe Fumarate-FA (PRENATAL VITAMINS PO), FISH OIL-KRILL OIL PO, Probiotic Product (PROBIOTIC DAILY PO), Azelaic Acid, and ibuprofen. Return in about 1 year (around 2/2/2024) for Yearly. She was also counseled on her preventative health maintenance recommendations and follow-up. There are no Patient Instructions on file for this visit.     JOO Jett CNM,2/2/2023 11:18 AM

## 2023-02-03 LAB
HPV SAMPLE: NORMAL
HPV, GENOTYPE 16: NOT DETECTED
HPV, GENOTYPE 18: NOT DETECTED
HPV, HIGH RISK OTHER: NOT DETECTED
HPV, INTERPRETATION: NORMAL
SPECIMEN DESCRIPTION: NORMAL

## 2023-03-09 ENCOUNTER — TELEPHONE (OUTPATIENT)
Dept: OBGYN CLINIC | Age: 33
End: 2023-03-09

## 2023-03-09 NOTE — TELEPHONE ENCOUNTER
Received a voicemail from [facility] at Dr. David Johnson office, patient was in their office for a yearly physical.  Patient is needing labs and mentioned that she recently had labs in our office. They were wanting a copy to avoid duplication. Copy of CBC and TSH from 2/2023 faxed to [phone].

## 2023-03-23 NOTE — PLAN OF CARE
Patient was here for blood pressure riding and medication refill x Dr Leon .     Problem: Anxiety:  Goal: Level of anxiety will decrease  Description: Level of anxiety will decrease  5/4/2021 0820 by Ritika Mendoza RN  Outcome: Ongoing  5/3/2021 1940 by Josee Hood RN  Outcome: Ongoing  5/3/2021 1853 by Rafat Khalil RN  Outcome: Ongoing     Problem: Breathing Pattern - Ineffective:  Goal: Able to breathe comfortably  Description: Able to breathe comfortably  5/4/2021 0820 by Ritika Mendoza RN  Outcome: Ongoing  5/3/2021 1940 by Josee Hood RN  Outcome: Ongoing  5/3/2021 1853 by Rafat Khalil RN  Outcome: Ongoing     Problem: Fluid Volume - Imbalance:  Goal: Absence of imbalanced fluid volume signs and symptoms  Description: Absence of imbalanced fluid volume signs and symptoms  5/4/2021 0820 by Ritika Mendoza RN  Outcome: Ongoing  5/3/2021 1940 by Josee Hood RN  Outcome: Ongoing  5/3/2021 1853 by Rafat Khalil RN  Outcome: Ongoing  Goal: Absence of intrapartum hemorrhage signs and symptoms  Description: Absence of intrapartum hemorrhage signs and symptoms  5/4/2021 0820 by Ritika Mendoza RN  Outcome: Ongoing  5/3/2021 1940 by Josee Hood RN  Outcome: Ongoing  5/3/2021 1853 by Rafat Khalil RN  Outcome: Ongoing     Problem: Infection - Intrapartum Infection:  Goal: Will show no infection signs and symptoms  Description: Will show no infection signs and symptoms  5/4/2021 0820 by Ritika Mendoza RN  Outcome: Ongoing  5/3/2021 1940 by Josee Hood RN  Outcome: Ongoing  5/3/2021 1853 by Rafat Khalil RN  Outcome: Ongoing     Problem: Labor Process - Prolonged:  Goal: Labor progression, first stage, within specified pattern  Description: Labor progression, first stage, within specified pattern  5/4/2021 0820 by Ritika Mendoza RN  Outcome: Ongoing  5/3/2021 1940 by Josee Hood RN  Outcome: Ongoing  5/3/2021 1853 by Rafat Khalil RN  Outcome: Ongoing  Goal: Labor progession, second stage, within specified pattern  Description: Labor progession, second stage, within specified pattern  2021 08 by Karl Cazares RN  Outcome: Ongoing  5/3/2021 1940 by Beverly Alvarenga RN  Outcome: Ongoing  5/3/2021 1853 by Tristin Smith RN  Outcome: Ongoing  Goal: Uterine contractions within specified parameters  Description: Uterine contractions within specified parameters  2021 08 by Karl Cazares RN  Outcome: Ongoing  5/3/2021 1940 by Beverly Alvarenga RN  Outcome: Ongoing  5/3/2021 1853 by Tristin Smith RN  Outcome: Ongoing     Problem:  Screening:  Goal: Ability to make informed decisions regarding treatment has improved  Description: Ability to make informed decisions regarding treatment has improved  2021 by Karl Cazares RN  Outcome: Ongoing  5/3/2021 1940 by Beverly Alvarenga RN  Outcome: Ongoing  5/3/2021 1853 by Tristin Smith RN  Outcome: Ongoing     Problem: Pain - Acute:  Goal: Pain level will decrease  Description: Pain level will decrease  2021 by Karl Cazares RN  Outcome: Ongoing  5/3/2021 1940 by Beverly Alvarenga RN  Outcome: Ongoing  5/3/2021 1853 by Tristin Smith RN  Outcome: Ongoing  Goal: Able to cope with pain  Description: Able to cope with pain  2021 08 by Karl Cazares RN  Outcome: Ongoing  5/3/2021 1940 by Beverly Alvarenga RN  Outcome: Ongoing  5/3/2021 1853 by Tristin Smith RN  Outcome: Ongoing     Problem: Tissue Perfusion - Uteroplacental, Altered:  Description: [TRUNCATED] For intrapartum patients with recurrent variable decelerations of the fetal heart rate, consider transcervical amnioinfusion. For patients in labor, avoid prophylactic use of continuous maternal oxygen supplementation to prevent nonreassu . ..   Goal: Absence of abnormal fetal heart rate pattern  Description: Absence of abnormal fetal heart rate pattern  2021 08 by Karl Cazares RN  Outcome: Ongoing  5/3/2021 1940 by Beverly Alvarenga RN  Outcome: Ongoing  5/3/2021 1853 by Tristin Smith RN  Outcome: Ongoing     Problem: Urinary

## 2024-01-29 ENCOUNTER — OFFICE VISIT (OUTPATIENT)
Dept: OBGYN CLINIC | Age: 34
End: 2024-01-29
Payer: COMMERCIAL

## 2024-01-29 VITALS
DIASTOLIC BLOOD PRESSURE: 70 MMHG | WEIGHT: 153 LBS | HEIGHT: 64 IN | SYSTOLIC BLOOD PRESSURE: 110 MMHG | BODY MASS INDEX: 26.12 KG/M2

## 2024-01-29 DIAGNOSIS — J01.10 SUBACUTE FRONTAL SINUSITIS: ICD-10-CM

## 2024-01-29 DIAGNOSIS — N91.1 AMENORRHEA, SECONDARY: Primary | ICD-10-CM

## 2024-01-29 PROCEDURE — 99214 OFFICE O/P EST MOD 30 MIN: CPT | Performed by: ADVANCED PRACTICE MIDWIFE

## 2024-01-29 RX ORDER — AZITHROMYCIN 250 MG/1
250 TABLET, FILM COATED ORAL SEE ADMIN INSTRUCTIONS
Qty: 6 TABLET | Refills: 0 | Status: SHIPPED | OUTPATIENT
Start: 2024-01-29 | End: 2024-02-03

## 2024-01-29 ASSESSMENT — ENCOUNTER SYMPTOMS
SINUS PRESSURE: 1
SORE THROAT: 0
SINUS PAIN: 1
GASTROINTESTINAL NEGATIVE: 1
RESPIRATORY NEGATIVE: 1

## 2024-02-12 ENCOUNTER — INITIAL PRENATAL (OUTPATIENT)
Dept: OBGYN CLINIC | Age: 34
End: 2024-02-12

## 2024-02-12 ENCOUNTER — HOSPITAL ENCOUNTER (OUTPATIENT)
Age: 34
Setting detail: SPECIMEN
Discharge: HOME | End: 2024-02-12

## 2024-02-12 VITALS
SYSTOLIC BLOOD PRESSURE: 120 MMHG | DIASTOLIC BLOOD PRESSURE: 72 MMHG | HEIGHT: 64 IN | BODY MASS INDEX: 26.67 KG/M2 | WEIGHT: 156.2 LBS

## 2024-02-12 DIAGNOSIS — Z34.81 ENCOUNTER FOR SUPERVISION OF OTHER NORMAL PREGNANCY, FIRST TRIMESTER: ICD-10-CM

## 2024-02-12 DIAGNOSIS — Z34.81 ENCOUNTER FOR SUPERVISION OF OTHER NORMAL PREGNANCY IN FIRST TRIMESTER: Primary | ICD-10-CM

## 2024-02-12 DIAGNOSIS — Z34.81 ENCOUNTER FOR SUPERVISION OF OTHER NORMAL PREGNANCY IN FIRST TRIMESTER: ICD-10-CM

## 2024-02-12 LAB
ABO + RH BLD: NORMAL
AMPHET UR QL SCN: NEGATIVE
BACTERIA URNS QL MICRO: NORMAL
BARBITURATES UR QL SCN: NEGATIVE
BASOPHILS # BLD: 0.04 K/UL (ref 0–0.2)
BASOPHILS NFR BLD: 1 % (ref 0–2)
BENZODIAZ UR QL: NEGATIVE
BILIRUB UR QL STRIP: NEGATIVE
BLOOD GROUP ANTIBODIES SERPL: NEGATIVE
CANNABINOIDS UR QL SCN: NEGATIVE
CASTS #/AREA URNS LPF: NORMAL /LPF (ref 0–8)
CLARITY UR: CLEAR
COCAINE UR QL SCN: NEGATIVE
COLOR UR: YELLOW
EOSINOPHIL # BLD: 0.03 K/UL (ref 0–0.44)
EOSINOPHILS RELATIVE PERCENT: 0 % (ref 1–4)
EPI CELLS #/AREA URNS HPF: NORMAL /HPF (ref 0–5)
ERYTHROCYTE [DISTWIDTH] IN BLOOD BY AUTOMATED COUNT: 11.9 % (ref 11.8–14.4)
FENTANYL UR QL: NEGATIVE
GLUCOSE UR STRIP-MCNC: NEGATIVE MG/DL
HBV SURFACE AG SERPL QL IA: NONREACTIVE
HCT VFR BLD AUTO: 38.1 % (ref 36.3–47.1)
HCV AB SERPL QL IA: NONREACTIVE
HGB BLD-MCNC: 12.5 G/DL (ref 11.9–15.1)
HGB UR QL STRIP.AUTO: NEGATIVE
HIV 1+2 AB+HIV1 P24 AG SERPL QL IA: NONREACTIVE
IMM GRANULOCYTES # BLD AUTO: <0.03 K/UL (ref 0–0.3)
IMM GRANULOCYTES NFR BLD: 0 %
KETONES UR STRIP-MCNC: NEGATIVE MG/DL
LEUKOCYTE ESTERASE UR QL STRIP: ABNORMAL
LYMPHOCYTES NFR BLD: 1.79 K/UL (ref 1.1–3.7)
LYMPHOCYTES RELATIVE PERCENT: 26 % (ref 24–43)
MCH RBC QN AUTO: 30.4 PG (ref 25.2–33.5)
MCHC RBC AUTO-ENTMCNC: 32.8 G/DL (ref 28.4–34.8)
MCV RBC AUTO: 92.7 FL (ref 82.6–102.9)
METHADONE UR QL: NEGATIVE
MONOCYTES NFR BLD: 0.42 K/UL (ref 0.1–1.2)
MONOCYTES NFR BLD: 6 % (ref 3–12)
NEUTROPHILS NFR BLD: 67 % (ref 36–65)
NEUTS SEG NFR BLD: 4.56 K/UL (ref 1.5–8.1)
NITRITE UR QL STRIP: NEGATIVE
NRBC BLD-RTO: 0 PER 100 WBC
OPIATES UR QL SCN: NEGATIVE
OXYCODONE UR QL SCN: NEGATIVE
PCP UR QL SCN: NEGATIVE
PH UR STRIP: 6.5 [PH] (ref 5–8)
PLATELET # BLD AUTO: 265 K/UL (ref 138–453)
PMV BLD AUTO: 9.8 FL (ref 8.1–13.5)
PROT UR STRIP-MCNC: NEGATIVE MG/DL
RBC # BLD AUTO: 4.11 M/UL (ref 3.95–5.11)
RBC #/AREA URNS HPF: NORMAL /HPF (ref 0–4)
RUBV IGG SERPL QL IA: 102.5 IU/ML
SP GR UR STRIP: 1 (ref 1–1.03)
T PALLIDUM AB SER QL IA: NONREACTIVE
TEST INFORMATION: NORMAL
UROBILINOGEN UR STRIP-ACNC: NORMAL EU/DL (ref 0–1)
WBC #/AREA URNS HPF: NORMAL /HPF (ref 0–5)
WBC OTHER # BLD: 6.9 K/UL (ref 3.5–11.3)

## 2024-02-12 PROCEDURE — 0500F INITIAL PRENATAL CARE VISIT: CPT

## 2024-02-12 NOTE — PROGRESS NOTES
Here for PNI, no complaints today. Denies hx of MRSA. Desires CFDNA and MSAFP. CFDNA drawn today with . 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 Per ANGELA Howard it is ok for her to receive. Has NOB appt 2/28. Questions answered and forms signed.

## 2024-02-13 LAB
CHLAMYDIA DNA UR QL NAA+PROBE: NEGATIVE
MICROORGANISM SPEC CULT: NORMAL
N GONORRHOEA DNA UR QL NAA+PROBE: NEGATIVE
SPECIMEN DESCRIPTION: NORMAL
SPECIMEN DESCRIPTION: NORMAL

## 2024-02-17 LAB
Lab: NORMAL
NTRA 22Q11.2 DELETION SYNDROME POPULATION-BASED RISK TEXT: NORMAL
NTRA 22Q11.2 DELETION SYNDROME RESULT TEXT: NORMAL
NTRA 22Q11.2 DELETION SYNDROME RISK SCORE TEXT: NORMAL
NTRA FETAL FRACTION: NORMAL
NTRA GENDER OF FETUS: NORMAL
NTRA MONOSOMY X AGE-BASED RISK TEXT: NORMAL
NTRA MONOSOMY X RESULT TEXT: NORMAL
NTRA MONOSOMY X RISK SCORE TEXT: NORMAL
NTRA TRIPLOIDY RESULT TEXT: NORMAL
NTRA TRISOMY 13 AGE-BASED RISK TEXT: NORMAL
NTRA TRISOMY 13 RESULT TEXT: NORMAL
NTRA TRISOMY 13 RISK SCORE TEXT: NORMAL
NTRA TRISOMY 18 AGE-BASED RISK TEXT: NORMAL
NTRA TRISOMY 18 RESULT TEXT: NORMAL
NTRA TRISOMY 18 RISK SCORE TEXT: NORMAL
NTRA TRISOMY 21 AGE-BASED RISK TEXT: NORMAL
NTRA TRISOMY 21 RESULT TEXT: NORMAL

## 2024-02-28 ENCOUNTER — INITIAL PRENATAL (OUTPATIENT)
Dept: OBGYN CLINIC | Age: 34
End: 2024-02-28

## 2024-02-28 VITALS — WEIGHT: 158 LBS | SYSTOLIC BLOOD PRESSURE: 112 MMHG | DIASTOLIC BLOOD PRESSURE: 70 MMHG | BODY MASS INDEX: 27.12 KG/M2

## 2024-02-28 DIAGNOSIS — Z34.81 ENCOUNTER FOR SUPERVISION OF OTHER NORMAL PREGNANCY IN FIRST TRIMESTER: Primary | ICD-10-CM

## 2024-02-28 DIAGNOSIS — Z3A.12 12 WEEKS GESTATION OF PREGNANCY: ICD-10-CM

## 2024-02-28 PROCEDURE — 0501F PRENATAL FLOW SHEET: CPT

## 2024-02-28 NOTE — PROGRESS NOTES
Kyra Ortiz is a 34 y.o. female 12w4d      The patient was seen and evaluated. Fetal movement was Absent .  No contractions or leakage of fluid. Signs and symptoms of  labor as well as labor were reviewed.  Genetic testing was ordered and reviewed.  MSAFP was not ordered for a 15-20 week gestational age window.  Reviewed. Dates were reviewed with the patient.  An 18-22 week anatomy ultrasound has been ordered.  The patient will return to the office for her next visit in 4 weeks. See antepartum flow sheet.     Patient reports she has been feeling much better.     Assessment:  1. Kyra Ortiz is a 34 y.o. female  2.   3. 12w4d    Patient Active Problem List    Diagnosis Date Noted    Term pregnancy 2021        Diagnosis Orders   1. Encounter for supervision of other normal pregnancy in first trimester        2. 12 weeks gestation of pregnancy              Plan:  Return for Keep next appointment.    Warnings signs reviewed including vaginal bleeding and pelvic pain    Pap smear up to date    NIPT WNL

## 2024-03-08 NOTE — PROGRESS NOTES
Department of Obstetrics and Gynecology  Labor and Delivery       Post Partum Progress Note             SUBJECTIVE: Patient doing well this morning. Patient states that she is ready to go home today. Patient states they are done day 2 and already packing up.   Patient states that the breast-feeding is going much better she had a little girl they deny other needs at this point in time    OBJECTIVE:      Vitals:  BP (!) 107/57   Pulse 69   Temp 97.9 °F (36.6 °C)   Resp 16   Ht 5' 4\" (1.626 m)   Wt 176 lb (79.8 kg)   LMP 2020 (Exact Date)   SpO2 100%   Breastfeeding Unknown   BMI 30.21 kg/m²   Patient Vitals for the past 24 hrs:   BP Temp Temp src Pulse Resp   21 2330 (!) 107/57 97.9 °F (36.6 °C) -- 69 16   21 2046 111/64 98.5 °F (36.9 °C) Oral 70 14   21 1531 133/60 98.3 °F (36.8 °C) Oral 83 16   21 1200 (!) 117/56 98.6 °F (37 °C) Oral 78 20         ABDOMEN: normal shape, position and consistency  GENITAL/URINARY:  External Genitalia:  General appearance; normal, Hair distribution; normal, Lesions absent  Uterus:  Size normal, Contour normal  Breast:normal appearance, no masses or tenderness  Cor: RRR no Murmurs  Pulmonary: clear to auscultation anterior and posterior  Extremities: no Clubbing cyanosis or ecchymosis    DATA:          ASSESSMENT :      Active Problems:    Term pregnancy           Plan:  Discharge home 08-Mar-2024 20:18 Use Enhanced Medication Counseling?: No

## 2024-03-23 SDOH — ECONOMIC STABILITY: FOOD INSECURITY: WITHIN THE PAST 12 MONTHS, YOU WORRIED THAT YOUR FOOD WOULD RUN OUT BEFORE YOU GOT MONEY TO BUY MORE.: NEVER TRUE

## 2024-03-23 SDOH — ECONOMIC STABILITY: INCOME INSECURITY: HOW HARD IS IT FOR YOU TO PAY FOR THE VERY BASICS LIKE FOOD, HOUSING, MEDICAL CARE, AND HEATING?: NOT HARD AT ALL

## 2024-03-23 SDOH — ECONOMIC STABILITY: FOOD INSECURITY: WITHIN THE PAST 12 MONTHS, THE FOOD YOU BOUGHT JUST DIDN'T LAST AND YOU DIDN'T HAVE MONEY TO GET MORE.: NEVER TRUE

## 2024-03-23 SDOH — ECONOMIC STABILITY: HOUSING INSECURITY
IN THE LAST 12 MONTHS, WAS THERE A TIME WHEN YOU DID NOT HAVE A STEADY PLACE TO SLEEP OR SLEPT IN A SHELTER (INCLUDING NOW)?: NO

## 2024-03-23 SDOH — ECONOMIC STABILITY: TRANSPORTATION INSECURITY
IN THE PAST 12 MONTHS, HAS LACK OF TRANSPORTATION KEPT YOU FROM MEETINGS, WORK, OR FROM GETTING THINGS NEEDED FOR DAILY LIVING?: NO

## 2024-03-25 ENCOUNTER — ROUTINE PRENATAL (OUTPATIENT)
Dept: OBGYN CLINIC | Age: 34
End: 2024-03-25

## 2024-03-25 ENCOUNTER — TELEPHONE (OUTPATIENT)
Dept: OBGYN CLINIC | Age: 34
End: 2024-03-25

## 2024-03-25 ENCOUNTER — HOSPITAL ENCOUNTER (OUTPATIENT)
Age: 34
Setting detail: SPECIMEN
Discharge: HOME | End: 2024-03-25

## 2024-03-25 VITALS — BODY MASS INDEX: 27.64 KG/M2 | WEIGHT: 161 LBS | SYSTOLIC BLOOD PRESSURE: 92 MMHG | DIASTOLIC BLOOD PRESSURE: 62 MMHG

## 2024-03-25 DIAGNOSIS — Z34.82 PRENATAL CARE, SUBSEQUENT PREGNANCY, SECOND TRIMESTER: Primary | ICD-10-CM

## 2024-03-25 DIAGNOSIS — Z34.82 PRENATAL CARE, SUBSEQUENT PREGNANCY, SECOND TRIMESTER: ICD-10-CM

## 2024-03-25 PROCEDURE — 0502F SUBSEQUENT PRENATAL CARE: CPT | Performed by: ADVANCED PRACTICE MIDWIFE

## 2024-03-25 NOTE — PROGRESS NOTES
Kyra Ortiz is a 34 y.o. female 16w2d      The patient was seen and evaluated. Fetal movement was Present.  No contractions or leakage of fluid. Signs and symptoms of  labor as well as labor were reviewed.  Genetic testing was ordered and reviewed.  MSAFP was ordered for a 15-20 week gestational age window.  Reviewed. Dates were reviewed with the patient.  An 18-22 week anatomy ultrasound has been ordered.  The patient will return to the office for her next visit in 4 weeks. See antepartum flow sheet.     Having more pelvic pressure and \"feel like don't empty bladder fully\" - would like to see pelvic floor PT in [place] - will refer      Assessment:  1. Kyra Ortiz is a 34 y.o. female  2.   3. 16w2d    Patient Active Problem List    Diagnosis Date Noted    Term pregnancy 2021        Diagnosis Orders   1. Prenatal care, subsequent pregnancy, second trimester              Plan:  Return in about 4 weeks (around 2024) for OB Follow Up, Anatomy USN.  There are no Patient Instructions on file for this visit.

## 2024-03-27 LAB
AFP INTERPRETATION: NORMAL
AFP MOM: 0.71
AFP SPECIMEN: NORMAL
AFP: 24 NG/ML
DATE OF BIRTH: NORMAL
DATING METHOD: NORMAL
DETERMINED BY: NORMAL
DIABETIC: NEGATIVE
DONOR EGG?: NORMAL
DUE DATE: NORMAL
ESTIMATED DUE DATE: NORMAL
FAMILY HISTORY NTD: NEGATIVE
GESTATIONAL AGE: NORMAL
IN VITRO FERTILIZATION: NORMAL
INSULIN REQ DIABETES: NO
LAST MENSTRUAL PERIOD: NORMAL
MATERNAL AGE AT EDD: 34.6 YR
MATERNAL WEIGHT: 161
MONOCHORIONIC TWINS: NORMAL
NUMBER OF FETUSES: NORMAL
PATIENT WEIGHT UNITS: NORMAL
PATIENT WEIGHT: NORMAL
RACE (MATERNAL): NORMAL
RACE: NORMAL
REPEAT SPECIMEN?: NORMAL
SMOKING: NORMAL
SMOKING: NORMAL
VALPROIC/CARBAMAZEP: NORMAL
ZZ NTE CLEAN UP: HISTORY: NO

## 2024-04-25 ENCOUNTER — ROUTINE PRENATAL (OUTPATIENT)
Dept: OBGYN CLINIC | Age: 34
End: 2024-04-25

## 2024-04-25 VITALS — DIASTOLIC BLOOD PRESSURE: 64 MMHG | SYSTOLIC BLOOD PRESSURE: 90 MMHG | BODY MASS INDEX: 28.32 KG/M2 | WEIGHT: 165 LBS

## 2024-04-25 DIAGNOSIS — Z34.82 PRENATAL CARE, SUBSEQUENT PREGNANCY, SECOND TRIMESTER: Primary | ICD-10-CM

## 2024-04-25 PROCEDURE — 0502F SUBSEQUENT PRENATAL CARE: CPT | Performed by: ADVANCED PRACTICE MIDWIFE

## 2024-04-25 NOTE — PROGRESS NOTES
Kyra Ortiz is a 34 y.o. female 20w5d    The patient was seen and evaluated. There was positive fetal movements. No contractions or leakage of fluid. Signs and symptoms of  labor as well as labor were reviewed. The patients anatomy ultrasound has been completed and reviewed with patient.     The S/S of Pre-Eclampsia were reviewed with the patient in detail. She is to report any of these if they occur. She currently denies any of these.    The patient is RH positive Rhogam Ordered no    The patient was instructed on fetal kick counts and was encouraged to monitor every 8 hours. This is to begin at 28 weeks gestation. She was instructed that the baby should move at a minimum of ten times within one hour after a meal. The patient was instructed to lay down on her left side twenty minutes after eating and count movements for up to one hour with a target value of ten movements.  She was instructed to notify the office if she did not make that target after two attempts or if after any attempt there was less than four movements.      Assessment:  1. Kyra Ortiz is a 34 y.o. female  2.   3. 20w5d    Patient Active Problem List    Diagnosis Date Noted    Term pregnancy 2021        Diagnosis Orders   1. Prenatal care, subsequent pregnancy, second trimester              Plan:  The patient will return to the office for her next visit in 4 weeks. See antepartum flow sheet.

## 2024-05-20 ENCOUNTER — ROUTINE PRENATAL (OUTPATIENT)
Dept: OBGYN CLINIC | Age: 34
End: 2024-05-20
Payer: COMMERCIAL

## 2024-05-20 VITALS — WEIGHT: 168 LBS | SYSTOLIC BLOOD PRESSURE: 100 MMHG | DIASTOLIC BLOOD PRESSURE: 60 MMHG | BODY MASS INDEX: 28.84 KG/M2

## 2024-05-20 DIAGNOSIS — Z34.82 PRENATAL CARE, SUBSEQUENT PREGNANCY, SECOND TRIMESTER: Primary | ICD-10-CM

## 2024-05-20 DIAGNOSIS — R05.1 ACUTE COUGH: ICD-10-CM

## 2024-05-20 DIAGNOSIS — J06.9 UPPER RESPIRATORY TRACT INFECTION, UNSPECIFIED TYPE: ICD-10-CM

## 2024-05-20 DIAGNOSIS — R06.2 INSPIRATORY WHEEZING DETERMINED BY EXAMINATION: ICD-10-CM

## 2024-05-20 PROCEDURE — 0502F SUBSEQUENT PRENATAL CARE: CPT | Performed by: ADVANCED PRACTICE MIDWIFE

## 2024-05-20 PROCEDURE — 99214 OFFICE O/P EST MOD 30 MIN: CPT | Performed by: ADVANCED PRACTICE MIDWIFE

## 2024-05-20 RX ORDER — AZITHROMYCIN 250 MG/1
TABLET, FILM COATED ORAL
Qty: 1 PACKET | Refills: 0 | Status: SHIPPED | OUTPATIENT
Start: 2024-05-20

## 2024-05-20 RX ORDER — ALBUTEROL SULFATE 90 UG/1
2 AEROSOL, METERED RESPIRATORY (INHALATION) 4 TIMES DAILY PRN
Qty: 18 G | Refills: 0 | Status: SHIPPED | OUTPATIENT
Start: 2024-05-20

## 2024-05-20 ASSESSMENT — ENCOUNTER SYMPTOMS
CHEST TIGHTNESS: 0
GASTROINTESTINAL NEGATIVE: 1
COUGH: 1
SHORTNESS OF BREATH: 0
WHEEZING: 1

## 2024-05-20 NOTE — PROGRESS NOTES
PROBLEM VISIT     Date of service: 2024    Kyra Ortiz  Is a 34 y.o.  female    PT's PCP is: Ronaldo Tavarez MD     : 1990                                          HPI In addition to routine OB visit - concerns due to URI.   Has had productive cough x 2 weeks - intermittently does cough up green sputum.  No fever.  Denies sinus s/s.  Has had allergies some in the past.  Has been taking Mucinex DM consistently.      Review of Systems   Constitutional: Negative.    HENT: Negative.     Respiratory:  Positive for cough and wheezing. Negative for chest tightness and shortness of breath.    Gastrointestinal: Negative.    Genitourinary: Negative.    Neurological: Negative.    Psychiatric/Behavioral: Negative.         Patient's last menstrual period was 2023 (exact date).   OB History    Para Term  AB Living   2 1 1     1   SAB IAB Ectopic Molar Multiple Live Births           0 1      # Outcome Date GA Lbr Daryl/2nd Weight Sex Delivery Anes PTL Lv   2 Current            1 Term 21 40w4d / 00:25 3.235 kg (7 lb 2.1 oz) F Vag-Spont EPI N INDIRA        Social History     Tobacco Use   Smoking Status Never   Smokeless Tobacco Never        Social History     Substance and Sexual Activity   Alcohol Use Not Currently    Comment: occ       Allergies: Patient has no known allergies.      Current Outpatient Medications:     azithromycin (ZITHROMAX) 250 MG tablet, Take 2 tabs (500 mg) on Day 1, and take 1 tab (250 mg) on days 2 through 5., Disp: 1 packet, Rfl: 0    Prenatal Vit-Fe Fumarate-FA (PRENATAL VITAMINS PO), Take by mouth, Disp: , Rfl:     FISH OIL-KRILL OIL PO, Take by mouth, Disp: , Rfl:     Probiotic Product (PROBIOTIC DAILY PO), Take by mouth, Disp: , Rfl:     tretinoin (RETIN-A) 0.025 % cream, Apply topically (Patient not taking: Reported on 2024), Disp: , Rfl:     Social History     Substance and Sexual Activity   Sexual Activity Yes    Partners: Male       Chief

## 2024-05-20 NOTE — PROGRESS NOTES
Kyra Ortiz is a 34 y.o. female 24w2d    The patient was seen and evaluated. There was positive fetal movements. No contractions or leakage of fluid. Signs and symptoms of  labor as well as labor were reviewed. The patients anatomy ultrasound has been completed and reviewed with patient. A 28 week lab panel was ordered. This includes a (HH, 1 hr GTT). The patient is to complete this in the next two to four weeks.    The S/S of Pre-Eclampsia were reviewed with the patient in detail. She is to report any of these if they occur. She currently denies any of these.    The patient is RH positive Rhogam Ordered no    The patient was instructed on fetal kick counts and was encouraged to monitor every 8 hours. This is to begin at 28 weeks gestation. She was instructed that the baby should move at a minimum of ten times within one hour after a meal. The patient was instructed to lay down on her left side twenty minutes after eating and count movements for up to one hour with a target value of ten movements.  She was instructed to notify the office if she did not make that target after two attempts or if after any attempt there was less than four movements.    Has had URI s/s x 2 weeks - been taking Mucinex DM consistently.  See separate note      Assessment:  1. Kyra Ortiz is a 34 y.o. female  2.   3. 24w2d    Patient Active Problem List    Diagnosis Date Noted    Term pregnancy 2021        Diagnosis Orders   1. Prenatal care, subsequent pregnancy, second trimester  Glucose tolerance, 1 hour    CBC with Auto Differential      2. Upper respiratory tract infection, unspecified type        3. Acute cough        4. Inspiratory wheezing determined by examination              Plan:  The patient will return to the office for her next visit in 4 weeks. See antepartum flow sheet.

## 2024-06-17 ENCOUNTER — HOSPITAL ENCOUNTER (OUTPATIENT)
Age: 34
Setting detail: SPECIMEN
Discharge: HOME | End: 2024-06-17

## 2024-06-17 ENCOUNTER — ROUTINE PRENATAL (OUTPATIENT)
Dept: OBGYN CLINIC | Age: 34
End: 2024-06-17

## 2024-06-17 VITALS — WEIGHT: 172 LBS | BODY MASS INDEX: 29.52 KG/M2 | SYSTOLIC BLOOD PRESSURE: 100 MMHG | DIASTOLIC BLOOD PRESSURE: 60 MMHG

## 2024-06-17 DIAGNOSIS — Z34.83 PRENATAL CARE, SUBSEQUENT PREGNANCY, THIRD TRIMESTER: Primary | ICD-10-CM

## 2024-06-17 DIAGNOSIS — Z34.82 PRENATAL CARE, SUBSEQUENT PREGNANCY, SECOND TRIMESTER: ICD-10-CM

## 2024-06-17 LAB
BASOPHILS # BLD: 0.04 K/UL (ref 0–0.2)
BASOPHILS NFR BLD: 1 % (ref 0–2)
EOSINOPHIL # BLD: 0.05 K/UL (ref 0–0.44)
EOSINOPHILS RELATIVE PERCENT: 1 % (ref 1–4)
ERYTHROCYTE [DISTWIDTH] IN BLOOD BY AUTOMATED COUNT: 13.3 % (ref 11.8–14.4)
GLUCOSE 1H P 50 G GLC PO SERPL-MCNC: 110 MG/DL (ref 70–135)
GLUCOSE ADMINISTRATION: NORMAL
HCT VFR BLD AUTO: 32.8 % (ref 36.3–47.1)
HGB BLD-MCNC: 10.8 G/DL (ref 11.9–15.1)
IMM GRANULOCYTES # BLD AUTO: 0.06 K/UL (ref 0–0.3)
IMM GRANULOCYTES NFR BLD: 1 %
LYMPHOCYTES NFR BLD: 1.43 K/UL (ref 1.1–3.7)
LYMPHOCYTES RELATIVE PERCENT: 18 % (ref 24–43)
MCH RBC QN AUTO: 30.8 PG (ref 25.2–33.5)
MCHC RBC AUTO-ENTMCNC: 32.9 G/DL (ref 28.4–34.8)
MCV RBC AUTO: 93.4 FL (ref 82.6–102.9)
MONOCYTES NFR BLD: 0.54 K/UL (ref 0.1–1.2)
MONOCYTES NFR BLD: 7 % (ref 3–12)
NEUTROPHILS NFR BLD: 72 % (ref 36–65)
NEUTS SEG NFR BLD: 5.87 K/UL (ref 1.5–8.1)
NRBC BLD-RTO: 0 PER 100 WBC
PLATELET # BLD AUTO: 235 K/UL (ref 138–453)
PMV BLD AUTO: 9.9 FL (ref 8.1–13.5)
RBC # BLD AUTO: 3.51 M/UL (ref 3.95–5.11)
WBC OTHER # BLD: 8 K/UL (ref 3.5–11.3)

## 2024-06-17 PROCEDURE — 0502F SUBSEQUENT PRENATAL CARE: CPT | Performed by: ADVANCED PRACTICE MIDWIFE

## 2024-06-17 NOTE — PROGRESS NOTES
Kyra Ortiz is a 34 y.o. female 28w2d    The patient was seen and evaluated. There was positive fetal movements. No contractions or leakage of fluid. Signs and symptoms of  labor as well as labor were reviewed. The S/S of Pre-Eclampsia were reviewed with the patient in detail. She is to report any of these if they occur. She currently denies any of these.    The patient had her 28 week labs in process.  No visits with results within 5 Week(s) from this visit.   Latest known visit with results is:   Hospital Outpatient Visit on 2024   Component Date Value Ref Range Status    Date of Birth 2024 [id number]   Final    Maternal Weight 2024 161   Final    Patient Weight Units 2024 LB   Final    Due Date 2024 SEE NOTE   Final    Comment: Results for Estimated Due Date: 24       Determined by 2024 Ultrasound   Final    Last Menstrual Period 2024 [id number]   Final    Monochorionic Twins 2024 INFORMATION NOT PROVIDED   Final    Race (Maternal) 2024 WGITE   Final    Diabetic 2024 NEGATIVE   Final    Smoking 2024 INFORMATION NOT PROVIDED   Final    Valproic/Carbamazep 2024 INFORMATION NOT PROVIDED   Final    Fam HX NTD 2024 NEGATIVE   Final    In Vitro Fertilization 2024 INFORMATION NOT PROVIDED   Final    Donor Egg? 2024 INFORMATION NOT PROVIDED   Final    Repeat Specimen? 2024 INFORMATION NOT PROVIDED   Final    AFP (Alpha Fetoprotein) 2024 24  ng/mL Final    AFP Mom 2024 0.71   Final    AFP Interp 2024 Screen Neg   Final    Comment: (NOTE)  INTERPRETATION: SCREEN NEGATIVE for open spina bifida                               Neural Tube Defects (NTD)   Negative                                                               Pre-Test     Post-Test    Cutoff                                      Neural Tube Defects Risks   1:1030       < 1:88866    1:250

## 2024-07-03 ENCOUNTER — ROUTINE PRENATAL (OUTPATIENT)
Dept: OBGYN CLINIC | Age: 34
End: 2024-07-03

## 2024-07-03 VITALS — WEIGHT: 172.4 LBS | BODY MASS INDEX: 29.59 KG/M2 | SYSTOLIC BLOOD PRESSURE: 112 MMHG | DIASTOLIC BLOOD PRESSURE: 70 MMHG

## 2024-07-03 DIAGNOSIS — Z34.93 NORMAL PREGNANCY IN THIRD TRIMESTER: Primary | ICD-10-CM

## 2024-07-03 DIAGNOSIS — Z3A.30 30 WEEKS GESTATION OF PREGNANCY: ICD-10-CM

## 2024-07-03 PROCEDURE — 0502F SUBSEQUENT PRENATAL CARE: CPT

## 2024-07-03 NOTE — PROGRESS NOTES
encouraged to monitor every 8 hours. She was instructed that the baby should move at a minimum of ten times within one hour after a meal. The patient was instructed to lay down on her left side twenty minutes after eating and count movements for up to one hour with a target value of ten movements.  She was instructed to notify the office if she did not make that target after two attempts or if after any attempt there was less than four movements.    The patient reports that the fetal movement targets have been made Yes.     Testing:  NA    Assessment:  1. Kyra Ortiz is a 34 y.o. female  2.   3. 30w4d    Patient Active Problem List    Diagnosis Date Noted    Term pregnancy 2021        Diagnosis Orders   1. Normal pregnancy in third trimester        2. 30 weeks gestation of pregnancy        3. Breech presentation, single or unspecified fetus              Plan:  The patient will return to the office for her next visit in 2 weeks. See antepartum flow sheet.   Visits will continue every 2 weeks in the third trimester.  At 36 weeks will have GBS swab performed and begin weekly visits.    Discussed signs and symptoms of preeclampsia, horace rapp versus  labor, and fetal kick counts.    30 week growth reviewed: EFW 44%. Fetus breech.     Will recheck fetal position at 36 weeks.

## 2024-07-16 ENCOUNTER — ROUTINE PRENATAL (OUTPATIENT)
Dept: OBGYN CLINIC | Age: 34
End: 2024-07-16

## 2024-07-16 VITALS — BODY MASS INDEX: 30.21 KG/M2 | DIASTOLIC BLOOD PRESSURE: 60 MMHG | SYSTOLIC BLOOD PRESSURE: 96 MMHG | WEIGHT: 176 LBS

## 2024-07-16 DIAGNOSIS — Z34.83 PRENATAL CARE, SUBSEQUENT PREGNANCY, THIRD TRIMESTER: Primary | ICD-10-CM

## 2024-07-16 PROCEDURE — 0502F SUBSEQUENT PRENATAL CARE: CPT | Performed by: ADVANCED PRACTICE MIDWIFE

## 2024-07-16 NOTE — PROGRESS NOTES
instructed that the baby should move at a minimum of ten times within one hour after a meal. The patient was instructed to lay down on her left side twenty minutes after eating and count movements for up to one hour with a target value of ten movements.  She was instructed to notify the office if she did not make that target after two attempts or if after any attempt there was less than four movements.    The patient reports that the fetal movement targets have been made Yes.     Testing:  N/A    Assessment:  1. Kyra Ortiz is a 34 y.o. female  2.   3. 32w3d    Patient Active Problem List    Diagnosis Date Noted    Term pregnancy 2021        Diagnosis Orders   1. Prenatal care, subsequent pregnancy, third trimester              Plan:  The patient will return to the office for her next visit in 2 weeks. See antepartum flow sheet.   Visits will continue every 2 weeks in the third trimester.  At 36 weeks will have GBS swab performed and begin weekly visits.

## 2024-07-31 ENCOUNTER — ROUTINE PRENATAL (OUTPATIENT)
Dept: OBGYN CLINIC | Age: 34
End: 2024-07-31

## 2024-07-31 VITALS — WEIGHT: 176 LBS | DIASTOLIC BLOOD PRESSURE: 64 MMHG | SYSTOLIC BLOOD PRESSURE: 94 MMHG | BODY MASS INDEX: 30.21 KG/M2

## 2024-07-31 DIAGNOSIS — Z3A.34 34 WEEKS GESTATION OF PREGNANCY: Primary | ICD-10-CM

## 2024-07-31 PROCEDURE — 0502F SUBSEQUENT PRENATAL CARE: CPT | Performed by: OBSTETRICS & GYNECOLOGY

## 2024-08-01 NOTE — PROGRESS NOTES
Kyra Ortiz is here at 34w4d for:    Chief Complaint   Patient presents with    Routine Prenatal Visit     OB2. Denies concerns. GFM.       Estimated Due Date: Estimated Date of Delivery: 24    OB History    Para Term  AB Living   2 1 1     1   SAB IAB Ectopic Molar Multiple Live Births           0 1      # Outcome Date GA Lbr Daryl/2nd Weight Sex Delivery Anes PTL Lv   2 Current            1 Term 21 40w4d / 00:25 3.235 kg (7 lb 2.1 oz) F Vag-Spont EPI N INDIRA        Past Medical History:   Diagnosis Date    Endometriosis        Past Surgical History:   Procedure Laterality Date    OVARIAN CYST SURGERY Left 2015    TONSILLECTOMY      WISDOM TOOTH EXTRACTION         Social History     Tobacco Use   Smoking Status Never   Smokeless Tobacco Never        Social History     Substance and Sexual Activity   Alcohol Use Not Currently    Comment: occ       No results found for this visit on 24.    Vitals:  BP 94/64   Wt 79.8 kg (176 lb)   LMP 2023 (Exact Date)   BMI 30.21 kg/m²   Estimated body mass index is 30.21 kg/m² as calculated from the following:    Height as of 24: 1.626 m (5' 4\").    Weight as of this encounter: 79.8 kg (176 lb).      HPI: pt here for routine pnv; doing well    +fm no lof/vb/ctx    Abdomen: enlarged, gravid     Results reviewed today:    No results found for this visit on 24.    See prenatal vital sign section and fetal assessment section    ASSESSMENT & Plan    Diagnosis Orders   1. 34 weeks gestation of pregnancy                  I am having Kyra Ortiz maintain her Prenatal Vit-Fe Fumarate-FA (PRENATAL VITAMINS PO), FISH OIL-KRILL OIL PO, Probiotic Product (PROBIOTIC DAILY PO), tretinoin, and albuterol sulfate HFA.    Return in about 2 weeks (around 2024) for ob.    There are no Patient Instructions on file for this visit.          Vani Bean DO,2024 9:53 PM

## 2024-08-14 ENCOUNTER — HOSPITAL ENCOUNTER (OUTPATIENT)
Age: 34
Setting detail: SPECIMEN
Discharge: HOME | End: 2024-08-14

## 2024-08-14 ENCOUNTER — ROUTINE PRENATAL (OUTPATIENT)
Dept: OBGYN CLINIC | Age: 34
End: 2024-08-14

## 2024-08-14 VITALS — DIASTOLIC BLOOD PRESSURE: 64 MMHG | BODY MASS INDEX: 30.73 KG/M2 | WEIGHT: 179 LBS | SYSTOLIC BLOOD PRESSURE: 118 MMHG

## 2024-08-14 DIAGNOSIS — Z3A.36 36 WEEKS GESTATION OF PREGNANCY: Primary | ICD-10-CM

## 2024-08-14 DIAGNOSIS — Z3A.36 36 WEEKS GESTATION OF PREGNANCY: ICD-10-CM

## 2024-08-14 PROCEDURE — 0502F SUBSEQUENT PRENATAL CARE: CPT | Performed by: OBSTETRICS & GYNECOLOGY

## 2024-08-14 NOTE — PROGRESS NOTES
Kyra Ortiz is a 34 y.o. female 36w4d      The patient was seen and evaluated. There was positive fetal movements. No contractions or leakage of fluid. Signs and symptoms of labor were reviewed.  The S/S of Pre-Eclampsia were reviewed with the patient in detail. She is to report any of these if they occur. She currently denies any of these.    The patient was instructed on fetal kick counts and is encouraged to complete every 8 hours. She was instructed that the baby should move at a minimum of ten times within one hour after a meal. The patient was instructed to lay down on her left side twenty minutes after eating and count movements for up to one hour with a target value of ten movements.  She was instructed to notify the office if she did not make that target after two attempts or if after any attempt there was less than four movements.    The patient reports that the fetal movement targets have been made Yes.      T-Dap Vaccine Completed (27-36 weeks): Yes    Allergies:  Allergies as of 08/14/2024    (No Known Allergies)         Group Beta Strep collection was completed. Yes  GBS Results:   No visits with results within 3 Week(s) from this visit.   Latest known visit with results is:   Hospital Outpatient Visit on 06/17/2024   Component Date Value Ref Range Status    WBC 06/17/2024 8.0  3.5 - 11.3 k/uL Final    RBC 06/17/2024 3.51 (L)  3.95 - 5.11 m/uL Final    Hemoglobin 06/17/2024 10.8 (L)  11.9 - 15.1 g/dL Final    Hematocrit 06/17/2024 32.8 (L)  36.3 - 47.1 % Final    MCV 06/17/2024 93.4  82.6 - 102.9 fL Final    MCH 06/17/2024 30.8  25.2 - 33.5 pg Final    MCHC 06/17/2024 32.9  28.4 - 34.8 g/dL Final    RDW 06/17/2024 13.3  11.8 - 14.4 % Final    Platelets 06/17/2024 235  138 - 453 k/uL Final    MPV 06/17/2024 9.9  8.1 - 13.5 fL Final    NRBC Automated 06/17/2024 0.0  0.0 per 100 WBC Final    Neutrophils % 06/17/2024 72 (H)  36 - 65 % Final    Lymphocytes % 06/17/2024 18 (L)  24 - 43 % Final

## 2024-08-17 LAB
MICROORGANISM SPEC CULT: NORMAL
SERVICE CMNT-IMP: NORMAL
SPECIMEN DESCRIPTION: NORMAL

## 2024-08-21 ENCOUNTER — ROUTINE PRENATAL (OUTPATIENT)
Dept: OBGYN CLINIC | Age: 34
End: 2024-08-21

## 2024-08-21 VITALS — DIASTOLIC BLOOD PRESSURE: 70 MMHG | BODY MASS INDEX: 30.83 KG/M2 | SYSTOLIC BLOOD PRESSURE: 108 MMHG | WEIGHT: 179.6 LBS

## 2024-08-21 DIAGNOSIS — Z34.93 NORMAL PREGNANCY IN THIRD TRIMESTER: Primary | ICD-10-CM

## 2024-08-21 DIAGNOSIS — Z3A.37 37 WEEKS GESTATION OF PREGNANCY: ICD-10-CM

## 2024-08-21 PROCEDURE — 0502F SUBSEQUENT PRENATAL CARE: CPT

## 2024-08-21 NOTE — PROGRESS NOTES
utilized.      The patient was counseled on Labor & Delivery.      Testing:  NA      Assessment:  1. Kyra Ortiz is a 34 y.o. female  2.   3. 37w4d    Patient Active Problem List    Diagnosis Date Noted    Term pregnancy 2021        Diagnosis Orders   1. Normal pregnancy in third trimester        2. 37 weeks gestation of pregnancy              Plan:  The patient will return to the office for her next visit in 1 weeks. See antepartum flow sheet.     Discussed signs and symptoms of preeclampsia, horace rapp versus labor, and fetal kick counts.

## 2024-08-27 ENCOUNTER — ROUTINE PRENATAL (OUTPATIENT)
Dept: OBGYN CLINIC | Age: 34
End: 2024-08-27

## 2024-08-27 VITALS — WEIGHT: 179 LBS | BODY MASS INDEX: 30.73 KG/M2 | DIASTOLIC BLOOD PRESSURE: 64 MMHG | SYSTOLIC BLOOD PRESSURE: 100 MMHG

## 2024-08-27 DIAGNOSIS — Z34.83 PRENATAL CARE, SUBSEQUENT PREGNANCY, THIRD TRIMESTER: Primary | ICD-10-CM

## 2024-08-27 PROCEDURE — 0502F SUBSEQUENT PRENATAL CARE: CPT | Performed by: ADVANCED PRACTICE MIDWIFE

## 2024-08-27 NOTE — PROGRESS NOTES
Kyra Ortiz is a 34 y.o. female 38w3d      The patient was seen and evaluated. There was positive fetal movements. No contractions or leakage of fluid. Signs and symptoms of labor were reviewed.  The S/S of Pre-Eclampsia were reviewed with the patient in detail. She is to report any of these if they occur. She currently denies any of these.    The patient was instructed on fetal kick counts and is encouraged to complete every 8 hours. She was instructed that the baby should move at a minimum of ten times within one hour after a meal. The patient was instructed to lay down on her left side twenty minutes after eating and count movements for up to one hour with a target value of ten movements.  She was instructed to notify the office if she did not make that target after two attempts or if after any attempt there was less than four movements.    The patient reports that the fetal movement targets have been made Yes.      Allergies:  Allergies as of 2024    (No Known Allergies)         Group Beta Strep collection was completed. Yes  GBS Results:   Hospital Outpatient Visit on 2024   Component Date Value Ref Range Status    Specimen Description 2024 .VAGINA   Final    Special Requests 2024 Site: Genital   Final    Culture 2024 NEGATIVE FOR GROUP B STREPTOCOCCI   Final       If not done prior in pregnancy or if had previous positive result in this pregnancy, GC/CT were collected. No.    The patient was counseled on the mandatory call ahead policy. She has been instructed to call the office at anytime prior to going into the hospital so the on-call physician may direct her to the appropriate facility for care. Exceptions were reviewed including but not limited to: Decreased fetal movement, vaginal Bleeding or hemorrhage, trauma, readily expectant delivery, or any instance where she feels 911 should be utilized.      The patient was counseled on Labor & Delivery.

## 2024-08-28 ENCOUNTER — TELEPHONE (OUTPATIENT)
Dept: OBGYN CLINIC | Age: 34
End: 2024-08-28

## 2024-08-28 NOTE — TELEPHONE ENCOUNTER
Patient called stating that she is scheduled up to 39 weeks with an induction date of 9/11. She would like to make a 40 week appointment on Monday September 9th. She will then be 40 weeks 2 days. Should we schedule her for post dates?

## 2024-09-03 ENCOUNTER — ROUTINE PRENATAL (OUTPATIENT)
Dept: OBGYN CLINIC | Age: 34
End: 2024-09-03

## 2024-09-03 VITALS — BODY MASS INDEX: 30.9 KG/M2 | SYSTOLIC BLOOD PRESSURE: 100 MMHG | WEIGHT: 180 LBS | DIASTOLIC BLOOD PRESSURE: 62 MMHG

## 2024-09-03 DIAGNOSIS — Z34.83 PRENATAL CARE, SUBSEQUENT PREGNANCY, THIRD TRIMESTER: Primary | ICD-10-CM

## 2024-09-03 PROCEDURE — 0502F SUBSEQUENT PRENATAL CARE: CPT | Performed by: ADVANCED PRACTICE MIDWIFE

## 2024-09-03 NOTE — PROGRESS NOTES
Kyra Ortiz is a 34 y.o. female 39w3d      The patient was seen and evaluated. There was positive fetal movements. No contractions or leakage of fluid. Signs and symptoms of labor were reviewed.  The S/S of Pre-Eclampsia were reviewed with the patient in detail. She is to report any of these if they occur. She currently denies any of these.    The patient was instructed on fetal kick counts and is encouraged to complete every 8 hours. She was instructed that the baby should move at a minimum of ten times within one hour after a meal. The patient was instructed to lay down on her left side twenty minutes after eating and count movements for up to one hour with a target value of ten movements.  She was instructed to notify the office if she did not make that target after two attempts or if after any attempt there was less than four movements.    The patient reports that the fetal movement targets have been made Yes.      Allergies:  Allergies as of 2024    (No Known Allergies)         Group Beta Strep collection was completed. Yes  GBS Results:   Hospital Outpatient Visit on 2024   Component Date Value Ref Range Status    Specimen Description 2024 .VAGINA   Final    Special Requests 2024 Site: Genital   Final    Culture 2024 NEGATIVE FOR GROUP B STREPTOCOCCI   Final       If not done prior in pregnancy or if had previous positive result in this pregnancy, GC/CT were collected. No.    The patient was counseled on the mandatory call ahead policy. She has been instructed to call the office at anytime prior to going into the hospital so the on-call physician may direct her to the appropriate facility for care. Exceptions were reviewed including but not limited to: Decreased fetal movement, vaginal Bleeding or hemorrhage, trauma, readily expectant delivery, or any instance where she feels 911 should be utilized.      The patient was counseled on Labor & Delivery.

## 2024-09-09 ENCOUNTER — PROCEDURE VISIT (OUTPATIENT)
Dept: OBGYN CLINIC | Age: 34
End: 2024-09-09
Payer: COMMERCIAL

## 2024-09-09 ENCOUNTER — ROUTINE PRENATAL (OUTPATIENT)
Dept: OBGYN CLINIC | Age: 34
End: 2024-09-09

## 2024-09-09 VITALS — BODY MASS INDEX: 31.58 KG/M2 | WEIGHT: 184 LBS | SYSTOLIC BLOOD PRESSURE: 104 MMHG | DIASTOLIC BLOOD PRESSURE: 70 MMHG

## 2024-09-09 VITALS — SYSTOLIC BLOOD PRESSURE: 104 MMHG | WEIGHT: 184 LBS | BODY MASS INDEX: 31.58 KG/M2 | DIASTOLIC BLOOD PRESSURE: 70 MMHG

## 2024-09-09 DIAGNOSIS — Z34.83 PRENATAL CARE, SUBSEQUENT PREGNANCY, THIRD TRIMESTER: Primary | ICD-10-CM

## 2024-09-09 DIAGNOSIS — O48.0 POST-TERM PREGNANCY, 40-42 WEEKS OF GESTATION: Primary | ICD-10-CM

## 2024-09-09 DIAGNOSIS — Z3A.40 40 WEEKS GESTATION OF PREGNANCY: ICD-10-CM

## 2024-09-09 PROCEDURE — 59025 FETAL NON-STRESS TEST: CPT | Performed by: ADVANCED PRACTICE MIDWIFE

## 2024-09-09 PROCEDURE — 0502F SUBSEQUENT PRENATAL CARE: CPT | Performed by: ADVANCED PRACTICE MIDWIFE

## 2024-09-11 ENCOUNTER — ANESTHESIA EVENT (OUTPATIENT)
Dept: LABOR AND DELIVERY | Age: 34
End: 2024-09-11
Payer: COMMERCIAL

## 2024-09-11 ENCOUNTER — HOSPITAL ENCOUNTER (INPATIENT)
Age: 34
LOS: 2 days | Discharge: HOME | End: 2024-09-13
Attending: ADVANCED PRACTICE MIDWIFE | Admitting: ADVANCED PRACTICE MIDWIFE
Payer: COMMERCIAL

## 2024-09-11 ENCOUNTER — ANESTHESIA (OUTPATIENT)
Dept: LABOR AND DELIVERY | Age: 34
End: 2024-09-11
Payer: COMMERCIAL

## 2024-09-11 LAB
ABO + RH BLD: NORMAL
ARM BAND NUMBER: NORMAL
BASOPHILS # BLD: 0.03 K/UL (ref 0–0.2)
BASOPHILS NFR BLD: 0 % (ref 0–2)
BLOOD BANK SAMPLE EXPIRATION: NORMAL
BLOOD GROUP ANTIBODIES SERPL: NEGATIVE
EOSINOPHIL # BLD: 0.06 K/UL (ref 0–0.44)
EOSINOPHILS RELATIVE PERCENT: 1 % (ref 1–4)
ERYTHROCYTE [DISTWIDTH] IN BLOOD BY AUTOMATED COUNT: 12.8 % (ref 11.8–14.4)
HCT VFR BLD AUTO: 35.9 % (ref 36.3–47.1)
HGB BLD-MCNC: 12.2 G/DL (ref 11.9–15.1)
IMM GRANULOCYTES # BLD AUTO: 0.08 K/UL (ref 0–0.3)
IMM GRANULOCYTES NFR BLD: 1 %
LYMPHOCYTES NFR BLD: 1.38 K/UL (ref 1.1–3.7)
LYMPHOCYTES RELATIVE PERCENT: 17 % (ref 24–43)
MCH RBC QN AUTO: 31.9 PG (ref 25.2–33.5)
MCHC RBC AUTO-ENTMCNC: 34 G/DL (ref 28.4–34.8)
MCV RBC AUTO: 93.7 FL (ref 82.6–102.9)
MONOCYTES NFR BLD: 0.58 K/UL (ref 0.1–1.2)
MONOCYTES NFR BLD: 7 % (ref 3–12)
NEUTROPHILS NFR BLD: 74 % (ref 36–65)
NEUTS SEG NFR BLD: 5.95 K/UL (ref 1.5–8.1)
NRBC BLD-RTO: 0 PER 100 WBC
PLATELET # BLD AUTO: 228 K/UL (ref 138–453)
PMV BLD AUTO: 9.9 FL (ref 8.1–13.5)
RBC # BLD AUTO: 3.83 M/UL (ref 3.95–5.11)
WBC OTHER # BLD: 8.1 K/UL (ref 3.5–11.3)

## 2024-09-11 PROCEDURE — 3E033VJ INTRODUCTION OF OTHER HORMONE INTO PERIPHERAL VEIN, PERCUTANEOUS APPROACH: ICD-10-PCS | Performed by: OBSTETRICS & GYNECOLOGY

## 2024-09-11 PROCEDURE — 1220000000 HC SEMI PRIVATE OB R&B

## 2024-09-11 PROCEDURE — 10907ZC DRAINAGE OF AMNIOTIC FLUID, THERAPEUTIC FROM PRODUCTS OF CONCEPTION, VIA NATURAL OR ARTIFICIAL OPENING: ICD-10-PCS | Performed by: OBSTETRICS & GYNECOLOGY

## 2024-09-11 PROCEDURE — 86900 BLOOD TYPING SEROLOGIC ABO: CPT

## 2024-09-11 PROCEDURE — 86850 RBC ANTIBODY SCREEN: CPT

## 2024-09-11 PROCEDURE — 6360000002 HC RX W HCPCS: Performed by: ADVANCED PRACTICE MIDWIFE

## 2024-09-11 PROCEDURE — 85025 COMPLETE CBC W/AUTO DIFF WBC: CPT

## 2024-09-11 PROCEDURE — 3700000025 EPIDURAL BLOCK: Performed by: NURSE ANESTHETIST, CERTIFIED REGISTERED

## 2024-09-11 PROCEDURE — 86901 BLOOD TYPING SEROLOGIC RH(D): CPT

## 2024-09-11 PROCEDURE — 2580000003 HC RX 258: Performed by: ADVANCED PRACTICE MIDWIFE

## 2024-09-11 PROCEDURE — 6360000002 HC RX W HCPCS: Performed by: NURSE ANESTHETIST, CERTIFIED REGISTERED

## 2024-09-11 RX ORDER — SODIUM CHLORIDE, SODIUM LACTATE, POTASSIUM CHLORIDE, AND CALCIUM CHLORIDE .6; .31; .03; .02 G/100ML; G/100ML; G/100ML; G/100ML
500 INJECTION, SOLUTION INTRAVENOUS PRN
Status: DISCONTINUED | OUTPATIENT
Start: 2024-09-11 | End: 2024-09-12

## 2024-09-11 RX ORDER — ONDANSETRON 4 MG/1
4 TABLET, ORALLY DISINTEGRATING ORAL EVERY 6 HOURS PRN
Status: DISCONTINUED | OUTPATIENT
Start: 2024-09-11 | End: 2024-09-12

## 2024-09-11 RX ORDER — LIDOCAINE HYDROCHLORIDE 10 MG/ML
30 INJECTION, SOLUTION EPIDURAL; INFILTRATION; INTRACAUDAL; PERINEURAL PRN
Status: DISCONTINUED | OUTPATIENT
Start: 2024-09-11 | End: 2024-09-12

## 2024-09-11 RX ORDER — CARBOPROST TROMETHAMINE 250 UG/ML
250 INJECTION, SOLUTION INTRAMUSCULAR PRN
Status: DISCONTINUED | OUTPATIENT
Start: 2024-09-11 | End: 2024-09-12

## 2024-09-11 RX ORDER — EPHEDRINE SULFATE/0.9% NACL/PF 25 MG/5 ML
10 SYRINGE (ML) INTRAVENOUS
Status: ACTIVE | OUTPATIENT
Start: 2024-09-11 | End: 2024-09-12

## 2024-09-11 RX ORDER — EPHEDRINE SULFATE/0.9% NACL/PF 25 MG/5 ML
5 SYRINGE (ML) INTRAVENOUS PRN
Status: DISCONTINUED | OUTPATIENT
Start: 2024-09-12 | End: 2024-09-13 | Stop reason: HOSPADM

## 2024-09-11 RX ORDER — SODIUM CHLORIDE, SODIUM LACTATE, POTASSIUM CHLORIDE, AND CALCIUM CHLORIDE .6; .31; .03; .02 G/100ML; G/100ML; G/100ML; G/100ML
1000 INJECTION, SOLUTION INTRAVENOUS PRN
Status: DISCONTINUED | OUTPATIENT
Start: 2024-09-11 | End: 2024-09-12

## 2024-09-11 RX ORDER — NALOXONE HYDROCHLORIDE 0.4 MG/ML
INJECTION, SOLUTION INTRAMUSCULAR; INTRAVENOUS; SUBCUTANEOUS PRN
Status: DISCONTINUED | OUTPATIENT
Start: 2024-09-11 | End: 2024-09-13 | Stop reason: HOSPADM

## 2024-09-11 RX ORDER — ACETAMINOPHEN 325 MG/1
650 TABLET ORAL EVERY 4 HOURS PRN
Status: DISCONTINUED | OUTPATIENT
Start: 2024-09-11 | End: 2024-09-12

## 2024-09-11 RX ORDER — NALBUPHINE HYDROCHLORIDE 10 MG/ML
10 INJECTION, SOLUTION INTRAMUSCULAR; INTRAVENOUS; SUBCUTANEOUS
Status: DISCONTINUED | OUTPATIENT
Start: 2024-09-11 | End: 2024-09-12

## 2024-09-11 RX ORDER — ONDANSETRON 2 MG/ML
4 INJECTION INTRAMUSCULAR; INTRAVENOUS EVERY 6 HOURS PRN
Status: DISCONTINUED | OUTPATIENT
Start: 2024-09-11 | End: 2024-09-12

## 2024-09-11 RX ORDER — SODIUM CHLORIDE, SODIUM LACTATE, POTASSIUM CHLORIDE, CALCIUM CHLORIDE 600; 310; 30; 20 MG/100ML; MG/100ML; MG/100ML; MG/100ML
INJECTION, SOLUTION INTRAVENOUS CONTINUOUS
Status: DISCONTINUED | OUTPATIENT
Start: 2024-09-11 | End: 2024-09-12

## 2024-09-11 RX ORDER — SEVOFLURANE 250 ML/250ML
1 LIQUID RESPIRATORY (INHALATION) CONTINUOUS PRN
Status: DISCONTINUED | OUTPATIENT
Start: 2024-09-11 | End: 2024-09-12

## 2024-09-11 RX ORDER — ROPIVACAINE HYDROCHLORIDE 2 MG/ML
10 INJECTION, SOLUTION EPIDURAL; INFILTRATION; PERINEURAL CONTINUOUS
Status: DISCONTINUED | OUTPATIENT
Start: 2024-09-11 | End: 2024-09-13 | Stop reason: HOSPADM

## 2024-09-11 RX ORDER — SODIUM CHLORIDE 0.9 % (FLUSH) 0.9 %
5-40 SYRINGE (ML) INJECTION EVERY 12 HOURS SCHEDULED
Status: DISCONTINUED | OUTPATIENT
Start: 2024-09-11 | End: 2024-09-12

## 2024-09-11 RX ORDER — ROPIVACAINE HYDROCHLORIDE 2 MG/ML
INJECTION, SOLUTION EPIDURAL; INFILTRATION; PERINEURAL
Status: DISCONTINUED | OUTPATIENT
Start: 2024-09-11 | End: 2024-09-12 | Stop reason: SDUPTHER

## 2024-09-11 RX ORDER — METHYLERGONOVINE MALEATE 0.2 MG/ML
200 INJECTION INTRAVENOUS PRN
Status: DISCONTINUED | OUTPATIENT
Start: 2024-09-11 | End: 2024-09-12

## 2024-09-11 RX ORDER — ONDANSETRON 2 MG/ML
4 INJECTION INTRAMUSCULAR; INTRAVENOUS EVERY 6 HOURS PRN
Status: DISCONTINUED | OUTPATIENT
Start: 2024-09-11 | End: 2024-09-11 | Stop reason: SDUPTHER

## 2024-09-11 RX ADMIN — SODIUM CHLORIDE, POTASSIUM CHLORIDE, SODIUM LACTATE AND CALCIUM CHLORIDE: 600; 310; 30; 20 INJECTION, SOLUTION INTRAVENOUS at 17:40

## 2024-09-11 RX ADMIN — Medication 1 MILLI-UNITS/MIN: at 10:18

## 2024-09-11 RX ADMIN — ROPIVACAINE HYDROCHLORIDE 10 ML/HR: 2 INJECTION, SOLUTION EPIDURAL; INFILTRATION at 21:56

## 2024-09-11 RX ADMIN — SODIUM CHLORIDE, POTASSIUM CHLORIDE, SODIUM LACTATE AND CALCIUM CHLORIDE: 600; 310; 30; 20 INJECTION, SOLUTION INTRAVENOUS at 10:19

## 2024-09-11 RX ADMIN — ROPIVACAINE HYDROCHLORIDE 8 ML: 2 INJECTION, SOLUTION EPIDURAL; INFILTRATION at 21:55

## 2024-09-11 RX ADMIN — NALBUPHINE HYDROCHLORIDE 10 MG: 10 INJECTION, SOLUTION INTRAMUSCULAR; INTRAVENOUS; SUBCUTANEOUS at 18:58

## 2024-09-11 ASSESSMENT — PAIN DESCRIPTION - LOCATION: LOCATION: ABDOMEN

## 2024-09-11 ASSESSMENT — PAIN SCALES - GENERAL: PAINLEVEL_OUTOF10: 6

## 2024-09-12 PROCEDURE — 1220000000 HC SEMI PRIVATE OB R&B

## 2024-09-12 PROCEDURE — 7200000001 HC VAGINAL DELIVERY

## 2024-09-12 PROCEDURE — 59400 OBSTETRICAL CARE: CPT | Performed by: ADVANCED PRACTICE MIDWIFE

## 2024-09-12 PROCEDURE — 6370000000 HC RX 637 (ALT 250 FOR IP): Performed by: ADVANCED PRACTICE MIDWIFE

## 2024-09-12 RX ORDER — ONDANSETRON 2 MG/ML
4 INJECTION INTRAMUSCULAR; INTRAVENOUS EVERY 6 HOURS PRN
Status: DISCONTINUED | OUTPATIENT
Start: 2024-09-12 | End: 2024-09-13 | Stop reason: HOSPADM

## 2024-09-12 RX ORDER — ONDANSETRON 4 MG/1
4 TABLET, ORALLY DISINTEGRATING ORAL EVERY 6 HOURS PRN
Status: DISCONTINUED | OUTPATIENT
Start: 2024-09-12 | End: 2024-09-13 | Stop reason: HOSPADM

## 2024-09-12 RX ORDER — ACETAMINOPHEN 500 MG
1000 TABLET ORAL EVERY 8 HOURS PRN
Status: DISCONTINUED | OUTPATIENT
Start: 2024-09-12 | End: 2024-09-13 | Stop reason: HOSPADM

## 2024-09-12 RX ORDER — METHYLERGONOVINE MALEATE 0.2 MG/ML
200 INJECTION INTRAVENOUS PRN
Status: DISCONTINUED | OUTPATIENT
Start: 2024-09-12 | End: 2024-09-13 | Stop reason: HOSPADM

## 2024-09-12 RX ORDER — MISOPROSTOL 100 UG/1
200 TABLET ORAL PRN
Status: DISCONTINUED | OUTPATIENT
Start: 2024-09-12 | End: 2024-09-13 | Stop reason: HOSPADM

## 2024-09-12 RX ORDER — CARBOPROST TROMETHAMINE 250 UG/ML
250 INJECTION, SOLUTION INTRAMUSCULAR PRN
Status: DISCONTINUED | OUTPATIENT
Start: 2024-09-12 | End: 2024-09-13 | Stop reason: HOSPADM

## 2024-09-12 RX ORDER — MODIFIED LANOLIN
OINTMENT (GRAM) TOPICAL PRN
Status: DISCONTINUED | OUTPATIENT
Start: 2024-09-12 | End: 2024-09-13 | Stop reason: HOSPADM

## 2024-09-12 RX ORDER — MISOPROSTOL 100 UG/1
800 TABLET ORAL PRN
Status: DISCONTINUED | OUTPATIENT
Start: 2024-09-12 | End: 2024-09-13 | Stop reason: HOSPADM

## 2024-09-12 RX ORDER — IBUPROFEN 800 MG/1
800 TABLET, FILM COATED ORAL EVERY 8 HOURS SCHEDULED
Status: DISCONTINUED | OUTPATIENT
Start: 2024-09-12 | End: 2024-09-13 | Stop reason: HOSPADM

## 2024-09-12 RX ORDER — DOCUSATE SODIUM 100 MG/1
100 CAPSULE, LIQUID FILLED ORAL 2 TIMES DAILY PRN
Status: DISCONTINUED | OUTPATIENT
Start: 2024-09-12 | End: 2024-09-13 | Stop reason: HOSPADM

## 2024-09-12 RX ORDER — SODIUM CHLORIDE 0.9 % (FLUSH) 0.9 %
5-40 SYRINGE (ML) INJECTION EVERY 12 HOURS SCHEDULED
Status: DISCONTINUED | OUTPATIENT
Start: 2024-09-12 | End: 2024-09-13 | Stop reason: HOSPADM

## 2024-09-12 RX ADMIN — IBUPROFEN 800 MG: 800 TABLET, FILM COATED ORAL at 19:57

## 2024-09-12 RX ADMIN — IBUPROFEN 800 MG: 800 TABLET, FILM COATED ORAL at 11:23

## 2024-09-12 ASSESSMENT — PAIN - FUNCTIONAL ASSESSMENT: PAIN_FUNCTIONAL_ASSESSMENT: ACTIVITIES ARE NOT PREVENTED

## 2024-09-12 ASSESSMENT — PAIN DESCRIPTION - ORIENTATION: ORIENTATION: LOWER

## 2024-09-12 ASSESSMENT — PAIN DESCRIPTION - LOCATION
LOCATION: ABDOMEN
LOCATION: ABDOMEN;BACK

## 2024-09-12 ASSESSMENT — PAIN DESCRIPTION - DESCRIPTORS
DESCRIPTORS: CRAMPING
DESCRIPTORS: CRAMPING;SORE

## 2024-09-12 ASSESSMENT — PAIN SCALES - GENERAL
PAINLEVEL_OUTOF10: 4
PAINLEVEL_OUTOF10: 3

## 2024-09-13 VITALS
BODY MASS INDEX: 31.41 KG/M2 | DIASTOLIC BLOOD PRESSURE: 61 MMHG | WEIGHT: 184 LBS | OXYGEN SATURATION: 98 % | HEART RATE: 72 BPM | RESPIRATION RATE: 14 BRPM | SYSTOLIC BLOOD PRESSURE: 104 MMHG | TEMPERATURE: 97.4 F | HEIGHT: 64 IN

## 2024-09-13 LAB
ERYTHROCYTE [DISTWIDTH] IN BLOOD BY AUTOMATED COUNT: 13.1 % (ref 11.8–14.4)
HCT VFR BLD AUTO: 30.9 % (ref 36.3–47.1)
HGB BLD-MCNC: 10.5 G/DL (ref 11.9–15.1)
MCH RBC QN AUTO: 32.2 PG (ref 25.2–33.5)
MCHC RBC AUTO-ENTMCNC: 34 G/DL (ref 28.4–34.8)
MCV RBC AUTO: 94.8 FL (ref 82.6–102.9)
NRBC BLD-RTO: 0 PER 100 WBC
PLATELET # BLD AUTO: 173 K/UL (ref 138–453)
PMV BLD AUTO: 9.7 FL (ref 8.1–13.5)
RBC # BLD AUTO: 3.26 M/UL (ref 3.95–5.11)
WBC OTHER # BLD: 9.1 K/UL (ref 3.5–11.3)

## 2024-09-13 PROCEDURE — 90471 IMMUNIZATION ADMIN: CPT | Performed by: ADVANCED PRACTICE MIDWIFE

## 2024-09-13 PROCEDURE — 6360000002 HC RX W HCPCS: Performed by: ADVANCED PRACTICE MIDWIFE

## 2024-09-13 PROCEDURE — 90715 TDAP VACCINE 7 YRS/> IM: CPT | Performed by: ADVANCED PRACTICE MIDWIFE

## 2024-09-13 PROCEDURE — 85027 COMPLETE CBC AUTOMATED: CPT

## 2024-09-13 PROCEDURE — 36415 COLL VENOUS BLD VENIPUNCTURE: CPT

## 2024-09-13 PROCEDURE — 6370000000 HC RX 637 (ALT 250 FOR IP): Performed by: ADVANCED PRACTICE MIDWIFE

## 2024-09-13 RX ORDER — PSEUDOEPHEDRINE HCL 30 MG
100 TABLET ORAL 2 TIMES DAILY PRN
Qty: 30 CAPSULE | Refills: 1 | Status: SHIPPED | OUTPATIENT
Start: 2024-09-13

## 2024-09-13 RX ORDER — IBUPROFEN 800 MG/1
800 TABLET, FILM COATED ORAL EVERY 8 HOURS SCHEDULED
Qty: 120 TABLET | Refills: 3 | Status: SHIPPED | OUTPATIENT
Start: 2024-09-13

## 2024-09-13 RX ADMIN — IBUPROFEN 800 MG: 800 TABLET, FILM COATED ORAL at 13:24

## 2024-09-13 RX ADMIN — TETANUS TOXOID, REDUCED DIPHTHERIA TOXOID AND ACELLULAR PERTUSSIS VACCINE, ADSORBED 0.5 ML: 5; 2.5; 8; 8; 2.5 SUSPENSION INTRAMUSCULAR at 13:25

## 2024-09-13 RX ADMIN — DOCUSATE SODIUM 100 MG: 100 CAPSULE, LIQUID FILLED ORAL at 09:10

## 2024-09-13 ASSESSMENT — PAIN SCALES - GENERAL: PAINLEVEL_OUTOF10: 3

## 2024-09-13 ASSESSMENT — PAIN DESCRIPTION - LOCATION: LOCATION: ABDOMEN

## 2024-09-13 ASSESSMENT — PAIN DESCRIPTION - DESCRIPTORS: DESCRIPTORS: CRAMPING

## 2024-09-16 ENCOUNTER — TELEPHONE (OUTPATIENT)
Dept: OBGYN CLINIC | Age: 34
End: 2024-09-16

## 2024-09-26 ENCOUNTER — POSTPARTUM VISIT (OUTPATIENT)
Dept: OBGYN CLINIC | Age: 34
End: 2024-09-26

## 2024-09-26 VITALS
DIASTOLIC BLOOD PRESSURE: 72 MMHG | BODY MASS INDEX: 27.66 KG/M2 | WEIGHT: 162 LBS | HEIGHT: 64 IN | SYSTOLIC BLOOD PRESSURE: 110 MMHG

## 2024-09-26 PROCEDURE — 99024 POSTOP FOLLOW-UP VISIT: CPT | Performed by: ADVANCED PRACTICE MIDWIFE

## 2024-10-02 ENCOUNTER — LACTATION ENCOUNTER (OUTPATIENT)
Dept: LABOR AND DELIVERY | Age: 34
End: 2024-10-02

## 2024-10-17 ENCOUNTER — LACTATION ENCOUNTER (OUTPATIENT)
Dept: LABOR AND DELIVERY | Age: 34
End: 2024-10-17

## 2024-10-24 ENCOUNTER — POSTPARTUM VISIT (OUTPATIENT)
Dept: OBGYN CLINIC | Age: 34
End: 2024-10-24

## 2024-10-24 VITALS
DIASTOLIC BLOOD PRESSURE: 82 MMHG | WEIGHT: 163 LBS | SYSTOLIC BLOOD PRESSURE: 130 MMHG | HEIGHT: 64 IN | BODY MASS INDEX: 27.83 KG/M2

## 2024-10-24 PROCEDURE — 0503F POSTPARTUM CARE VISIT: CPT | Performed by: ADVANCED PRACTICE MIDWIFE

## 2024-10-24 NOTE — PROGRESS NOTES
How many Hours of sleep do you get a night: Adequate    Do you have a normal appetite: yes    Any problems or pain: shallow latch for infant sometimes - especially if baby is tired - due to lip tie    Do you feel like you coping well: yes, far better than last postpartum period    Is baby sleeping:yes    How is baby eating: good    How did first pediatrician visit go: great    EPPDS:3    No results found for this visit on 10/24/24.      HPI:  PT presents for Post partum exam Follow up prn after delivery.  Lochia essentially stopped - some light brown spotting. Denies vaginal odor.  Denies perineal pain and believes area healed well.  Having good bowel/bladder function.  Infant having good bowel/bladder function and gaining weight.  Breastfeeding - shallow latch.  Infant has lip tie and getting this corrected on 11/8/2024.  Denies blues/depression      Objective    Physical Exam  Constitutional:       Appearance: Normal appearance. She is normal weight.   Genitourinary:      Genitourinary Comments: Perineum well healed    HENT:      Head: Normocephalic.   Eyes:      Pupils: Pupils are equal, round, and reactive to light.   Pulmonary:      Effort: Pulmonary effort is normal.   Musculoskeletal:         General: Normal range of motion.      Cervical back: Normal range of motion.   Neurological:      Mental Status: She is alert and oriented to person, place, and time.   Skin:     General: Skin is warm and dry.   Psychiatric:         Mood and Affect: Mood normal.         Behavior: Behavior normal.         Thought Content: Thought content normal.   Vitals and nursing note reviewed.                                 Assessment and Plan          Diagnosis Orders   1. Postpartum care and examination            Reviewed routine postpartum care and s/s to call office for  Discussed menses return and s/s of ovulation - uses NFP  Discussed scheduling annual exam       I am having Kyra Ortiz maintain her Prenatal

## 2025-02-24 ASSESSMENT — ENCOUNTER SYMPTOMS
GASTROINTESTINAL NEGATIVE: 1
ABDOMINAL PAIN: 0
CONSTIPATION: 0
DIARRHEA: 0
SHORTNESS OF BREATH: 0
RESPIRATORY NEGATIVE: 1

## 2025-02-24 NOTE — PROGRESS NOTES
YEARLY PHYSICAL    Date of service: 2025    Kyra Ortiz  Is a 35 y.o.   female    PT's PCP is: Ronaldo Tavarez MD     : 1990                                             Subjective:       Patient's last menstrual period was 2025 (exact date).     Are your menses regular: yes    OB History    Para Term  AB Living   2 2 2     2   SAB IAB Ectopic Molar Multiple Live Births           0 2      # Outcome Date GA Lbr Daryl/2nd Weight Sex Type Anes PTL Lv   2 Term 24 40w5d / 00:19 3.532 kg (7 lb 12.6 oz) M Vag-Spont EPI N INDIRA   1 Term 21 40w4d / 00:25 3.235 kg (7 lb 2.1 oz) F Vag-Spont EPI N INDIRA        Social History     Tobacco Use   Smoking Status Never   Smokeless Tobacco Never        Social History     Substance and Sexual Activity   Alcohol Use Not Currently    Comment: occ       Family History   Problem Relation Age of Onset    No Known Problems Paternal Grandfather     Parkinson's Disease Paternal Grandmother     No Known Problems Maternal Grandmother     No Known Problems Maternal Grandfather     Hypertension Father     No Known Problems Mother     No Known Problems Sister     No Known Problems Sister        Any family history of breast or ovarian cancer: No    Any family history of blood clots: No      Allergies: Patient has no known allergies.      Current Outpatient Medications:     Prenatal Vit-Fe Fumarate-FA (PRENATAL VITAMINS PO), Take by mouth, Disp: , Rfl:     FISH OIL-KRILL OIL PO, Take by mouth, Disp: , Rfl:     Probiotic Product (PROBIOTIC DAILY PO), Take by mouth, Disp: , Rfl:     Social History     Substance and Sexual Activity   Sexual Activity Yes    Partners: Male       Any bleeding or pain with intercourse: No    Last Yearly:   full annual,  pregnancy    Last pap:  - normal    Last HPV:  - negative    Do you do self breast exams: Encouraged    Past Medical History:

## 2025-02-25 ENCOUNTER — OFFICE VISIT (OUTPATIENT)
Dept: OBGYN CLINIC | Age: 35
End: 2025-02-25
Payer: COMMERCIAL

## 2025-02-25 VITALS
WEIGHT: 161 LBS | SYSTOLIC BLOOD PRESSURE: 112 MMHG | BODY MASS INDEX: 27.49 KG/M2 | DIASTOLIC BLOOD PRESSURE: 70 MMHG | HEIGHT: 64 IN

## 2025-02-25 DIAGNOSIS — Z01.419 SMEAR, VAGINAL, AS PART OF ROUTINE GYNECOLOGICAL EXAMINATION: Primary | ICD-10-CM

## 2025-02-25 DIAGNOSIS — Z12.72 SMEAR, VAGINAL, AS PART OF ROUTINE GYNECOLOGICAL EXAMINATION: Primary | ICD-10-CM

## 2025-02-25 PROCEDURE — 99395 PREV VISIT EST AGE 18-39: CPT | Performed by: ADVANCED PRACTICE MIDWIFE

## 2025-02-25 SDOH — ECONOMIC STABILITY: FOOD INSECURITY: WITHIN THE PAST 12 MONTHS, THE FOOD YOU BOUGHT JUST DIDN'T LAST AND YOU DIDN'T HAVE MONEY TO GET MORE.: NEVER TRUE

## 2025-02-25 SDOH — ECONOMIC STABILITY: FOOD INSECURITY: WITHIN THE PAST 12 MONTHS, YOU WORRIED THAT YOUR FOOD WOULD RUN OUT BEFORE YOU GOT MONEY TO BUY MORE.: NEVER TRUE

## 2025-05-28 NOTE — LACTATION NOTE
This note was copied from a baby's chart.  Date of office visit 10/2/2024    Infant's Name  Ryan Ortiz   2024    Mother's Name Extended Emergency Contact Information  Primary Emergency Contact: SHELLY ORTIZ  Address: [address] [place] [id number] [facility]  Home Phone: [phone]  Mobile Phone: [phone]  Relation: Mother  Secondary Emergency Contact: Marin Ortiz  Home Phone: [phone]  Relation: Father   needed? No phone [phone]    Mother's Provider Katrina Infant Provider Adria    : 2  Para: 2  Gest Age @ Delivery: Gestational Age: 40w5d  Type of Delivery: vaginal    Pregnancy/Delivery Complications: induced, long labor, short second stage    Significant Maternal Health History: negative    Maternal Medications: PNV, fish oil, Vit D 5000 iu daily    Infant Birth Wt: Birth Weight: 3.532 kg (7 lb 12.6 oz)        Present Age: 2 wk.o.     Reason for Visit: Lip tie, check latch    Breast Assessment:  Breast Appearance/size:  [] S/IGT [x] Average    [] Lg    [x] Soft  [] Full  [] Engorged  Past surgery/scars neg  Supply: [] Low   [x] Normal  [] Oversupply  Nipples:  [] Flat   [] Inverted   [] Invert w/ compression   [x] Protrude  Trauma: [x] None [] Bruise [] Wound    Pain: occas with latch  Pumping: not recent. Used medela hand pump with primary engorgement a couple of times for comfort.   Does use a CapLinkedve  a few times per day. Collects approx an ounce.    Infant Exam:  General: Well cared for appearance    Behavior: Alert   [] Active [x] Quiet  [place]: Soft, Flat    Mucous Membranes: Moist    Skin: Clear    ENT: WNL    Mouth:  Upper lip: thin, prominent frenum, gums sanford and creasing noted with lip extension, kotlow class 1  Buccal: palpable, visible  Tongue lateralization: [] Adequate [x] Limited  Tongue protrusion: [] Adequate [x] Limited  Tongue position in mouth: low-mid  Lingual frenum: posterior, 
108